# Patient Record
Sex: MALE | Race: WHITE | Employment: OTHER | URBAN - METROPOLITAN AREA
[De-identification: names, ages, dates, MRNs, and addresses within clinical notes are randomized per-mention and may not be internally consistent; named-entity substitution may affect disease eponyms.]

---

## 2017-01-10 ENCOUNTER — OFFICE VISIT (OUTPATIENT)
Dept: INTERNAL MEDICINE CLINIC | Age: 67
End: 2017-01-10

## 2017-01-10 VITALS
SYSTOLIC BLOOD PRESSURE: 132 MMHG | BODY MASS INDEX: 26.64 KG/M2 | HEIGHT: 73 IN | WEIGHT: 201 LBS | DIASTOLIC BLOOD PRESSURE: 82 MMHG | HEART RATE: 98 BPM

## 2017-01-10 DIAGNOSIS — R80.9 PROTEINURIA: ICD-10-CM

## 2017-01-10 DIAGNOSIS — Z13.9 SCREENING: ICD-10-CM

## 2017-01-10 DIAGNOSIS — J43.1 PANLOBULAR EMPHYSEMA (HCC): ICD-10-CM

## 2017-01-10 DIAGNOSIS — E11.9 TYPE 2 DIABETES MELLITUS WITHOUT COMPLICATION, WITHOUT LONG-TERM CURRENT USE OF INSULIN (HCC): ICD-10-CM

## 2017-01-10 DIAGNOSIS — E11.69 HYPERLIPIDEMIA ASSOCIATED WITH TYPE 2 DIABETES MELLITUS (HCC): ICD-10-CM

## 2017-01-10 DIAGNOSIS — E55.9 VITAMIN D DEFICIENCY: ICD-10-CM

## 2017-01-10 DIAGNOSIS — E11.9 TYPE 2 DIABETES MELLITUS WITHOUT COMPLICATION, WITHOUT LONG-TERM CURRENT USE OF INSULIN (HCC): Primary | ICD-10-CM

## 2017-01-10 DIAGNOSIS — E78.5 HYPERLIPIDEMIA ASSOCIATED WITH TYPE 2 DIABETES MELLITUS (HCC): ICD-10-CM

## 2017-01-10 LAB
A/G RATIO: 1.8 (ref 1.1–2.2)
ALBUMIN SERPL-MCNC: 4.6 G/DL (ref 3.4–5)
ALP BLD-CCNC: 69 U/L (ref 40–129)
ALT SERPL-CCNC: 24 U/L (ref 10–40)
ANION GAP SERPL CALCULATED.3IONS-SCNC: 14 MMOL/L (ref 3–16)
AST SERPL-CCNC: 15 U/L (ref 15–37)
BILIRUB SERPL-MCNC: 0.3 MG/DL (ref 0–1)
BUN BLDV-MCNC: 10 MG/DL (ref 7–20)
CALCIUM SERPL-MCNC: 9.6 MG/DL (ref 8.3–10.6)
CHLORIDE BLD-SCNC: 101 MMOL/L (ref 99–110)
CHOLESTEROL, TOTAL: 114 MG/DL (ref 0–199)
CO2: 27 MMOL/L (ref 21–32)
CREAT SERPL-MCNC: 0.7 MG/DL (ref 0.8–1.3)
GFR AFRICAN AMERICAN: >60
GFR NON-AFRICAN AMERICAN: >60
GLOBULIN: 2.5 G/DL
GLUCOSE BLD-MCNC: 110 MG/DL (ref 70–99)
HDLC SERPL-MCNC: 41 MG/DL (ref 40–60)
HEPATITIS C ANTIBODY INTERPRETATION: NORMAL
LDL CHOLESTEROL CALCULATED: 46 MG/DL
POTASSIUM SERPL-SCNC: 5 MMOL/L (ref 3.5–5.1)
PROSTATE SPECIFIC ANTIGEN: 0.41 NG/ML (ref 0–4)
SODIUM BLD-SCNC: 142 MMOL/L (ref 136–145)
TOTAL PROTEIN: 7.1 G/DL (ref 6.4–8.2)
TRIGL SERPL-MCNC: 134 MG/DL (ref 0–150)
VITAMIN D 25-HYDROXY: 30.6 NG/ML
VLDLC SERPL CALC-MCNC: 27 MG/DL

## 2017-01-10 PROCEDURE — 99214 OFFICE O/P EST MOD 30 MIN: CPT | Performed by: INTERNAL MEDICINE

## 2017-01-10 PROCEDURE — 90670 PCV13 VACCINE IM: CPT | Performed by: INTERNAL MEDICINE

## 2017-01-10 PROCEDURE — 90471 IMMUNIZATION ADMIN: CPT | Performed by: INTERNAL MEDICINE

## 2017-07-20 ENCOUNTER — OFFICE VISIT (OUTPATIENT)
Dept: INTERNAL MEDICINE CLINIC | Age: 67
End: 2017-07-20

## 2017-07-20 VITALS
BODY MASS INDEX: 26.53 KG/M2 | TEMPERATURE: 97.7 F | HEART RATE: 80 BPM | HEIGHT: 73 IN | SYSTOLIC BLOOD PRESSURE: 114 MMHG | WEIGHT: 200.2 LBS | DIASTOLIC BLOOD PRESSURE: 72 MMHG | RESPIRATION RATE: 16 BRPM

## 2017-07-20 DIAGNOSIS — E11.42 DIABETIC POLYNEUROPATHY ASSOCIATED WITH TYPE 2 DIABETES MELLITUS (HCC): ICD-10-CM

## 2017-07-20 DIAGNOSIS — E11.9 TYPE 2 DIABETES MELLITUS WITHOUT COMPLICATION, WITH LONG-TERM CURRENT USE OF INSULIN (HCC): Primary | ICD-10-CM

## 2017-07-20 DIAGNOSIS — J43.1 PANLOBULAR EMPHYSEMA (HCC): ICD-10-CM

## 2017-07-20 DIAGNOSIS — Z79.4 TYPE 2 DIABETES MELLITUS WITHOUT COMPLICATION, WITH LONG-TERM CURRENT USE OF INSULIN (HCC): Primary | ICD-10-CM

## 2017-07-20 DIAGNOSIS — Z23 NEED FOR TDAP VACCINATION: ICD-10-CM

## 2017-07-20 DIAGNOSIS — E78.5 HYPERLIPIDEMIA ASSOCIATED WITH TYPE 2 DIABETES MELLITUS (HCC): ICD-10-CM

## 2017-07-20 DIAGNOSIS — E11.69 HYPERLIPIDEMIA ASSOCIATED WITH TYPE 2 DIABETES MELLITUS (HCC): ICD-10-CM

## 2017-07-20 PROCEDURE — 99214 OFFICE O/P EST MOD 30 MIN: CPT | Performed by: INTERNAL MEDICINE

## 2017-07-20 PROCEDURE — 90715 TDAP VACCINE 7 YRS/> IM: CPT | Performed by: INTERNAL MEDICINE

## 2017-07-20 PROCEDURE — 90471 IMMUNIZATION ADMIN: CPT | Performed by: INTERNAL MEDICINE

## 2017-07-20 ASSESSMENT — PATIENT HEALTH QUESTIONNAIRE - PHQ9
SUM OF ALL RESPONSES TO PHQ QUESTIONS 1-9: 0
1. LITTLE INTEREST OR PLEASURE IN DOING THINGS: 0
2. FEELING DOWN, DEPRESSED OR HOPELESS: 0
SUM OF ALL RESPONSES TO PHQ9 QUESTIONS 1 & 2: 0

## 2017-10-02 ENCOUNTER — NURSE ONLY (OUTPATIENT)
Dept: INTERNAL MEDICINE CLINIC | Age: 67
End: 2017-10-02

## 2017-10-02 DIAGNOSIS — Z23 IMMUNIZATION DUE: Primary | ICD-10-CM

## 2017-10-02 PROCEDURE — 90471 IMMUNIZATION ADMIN: CPT | Performed by: INTERNAL MEDICINE

## 2017-10-02 PROCEDURE — 90662 IIV NO PRSV INCREASED AG IM: CPT | Performed by: INTERNAL MEDICINE

## 2017-10-02 NOTE — PROGRESS NOTES
Vaccine Information Sheet, \"Influenza - Inactivated\"  given to Nichol Harris, or parent/legal guardian of  Nichol Harris and verbalized understanding. Patient responses:    Have you ever had a reaction to a flu vaccine? No  Are you able to eat eggs without adverse effects? Yes  Do you have any current illness? No  Have you ever had Guillian [place]el[place] Syndrome? No    Flu vaccine given per order. Please see immunization tab.

## 2018-01-22 ENCOUNTER — OFFICE VISIT (OUTPATIENT)
Dept: INTERNAL MEDICINE CLINIC | Age: 68
End: 2018-01-22

## 2018-01-22 VITALS
RESPIRATION RATE: 16 BRPM | BODY MASS INDEX: 26.61 KG/M2 | DIASTOLIC BLOOD PRESSURE: 66 MMHG | HEIGHT: 73 IN | WEIGHT: 200.8 LBS | SYSTOLIC BLOOD PRESSURE: 105 MMHG | HEART RATE: 76 BPM

## 2018-01-22 DIAGNOSIS — E11.9 TYPE 2 DIABETES MELLITUS WITHOUT COMPLICATION, WITHOUT LONG-TERM CURRENT USE OF INSULIN (HCC): Primary | ICD-10-CM

## 2018-01-22 DIAGNOSIS — E11.69 HYPERLIPIDEMIA ASSOCIATED WITH TYPE 2 DIABETES MELLITUS (HCC): ICD-10-CM

## 2018-01-22 DIAGNOSIS — E55.9 VITAMIN D DEFICIENCY: ICD-10-CM

## 2018-01-22 DIAGNOSIS — J43.1 PANLOBULAR EMPHYSEMA (HCC): ICD-10-CM

## 2018-01-22 DIAGNOSIS — E11.9 TYPE 2 DIABETES MELLITUS WITHOUT COMPLICATION, WITHOUT LONG-TERM CURRENT USE OF INSULIN (HCC): ICD-10-CM

## 2018-01-22 DIAGNOSIS — E78.5 HYPERLIPIDEMIA ASSOCIATED WITH TYPE 2 DIABETES MELLITUS (HCC): ICD-10-CM

## 2018-01-22 LAB
A/G RATIO: 1.9 (ref 1.1–2.2)
ALBUMIN SERPL-MCNC: 4.6 G/DL (ref 3.4–5)
ALP BLD-CCNC: 68 U/L (ref 40–129)
ALT SERPL-CCNC: 28 U/L (ref 10–40)
ANION GAP SERPL CALCULATED.3IONS-SCNC: 14 MMOL/L (ref 3–16)
AST SERPL-CCNC: 20 U/L (ref 15–37)
BILIRUB SERPL-MCNC: 0.3 MG/DL (ref 0–1)
BUN BLDV-MCNC: 12 MG/DL (ref 7–20)
CALCIUM SERPL-MCNC: 10 MG/DL (ref 8.3–10.6)
CHLORIDE BLD-SCNC: 101 MMOL/L (ref 99–110)
CHOLESTEROL, TOTAL: 115 MG/DL (ref 0–199)
CO2: 27 MMOL/L (ref 21–32)
CREAT SERPL-MCNC: 0.8 MG/DL (ref 0.8–1.3)
GFR AFRICAN AMERICAN: >60
GFR NON-AFRICAN AMERICAN: >60
GLOBULIN: 2.4 G/DL
GLUCOSE BLD-MCNC: 101 MG/DL (ref 70–99)
HBA1C MFR BLD: 6.2 %
HDLC SERPL-MCNC: 37 MG/DL (ref 40–60)
LDL CHOLESTEROL CALCULATED: 48 MG/DL
POTASSIUM SERPL-SCNC: 4.8 MMOL/L (ref 3.5–5.1)
SODIUM BLD-SCNC: 142 MMOL/L (ref 136–145)
TOTAL PROTEIN: 7 G/DL (ref 6.4–8.2)
TRIGL SERPL-MCNC: 149 MG/DL (ref 0–150)
VLDLC SERPL CALC-MCNC: 30 MG/DL

## 2018-01-22 PROCEDURE — 83036 HEMOGLOBIN GLYCOSYLATED A1C: CPT | Performed by: INTERNAL MEDICINE

## 2018-01-22 PROCEDURE — 90471 IMMUNIZATION ADMIN: CPT | Performed by: INTERNAL MEDICINE

## 2018-01-22 PROCEDURE — 99214 OFFICE O/P EST MOD 30 MIN: CPT | Performed by: INTERNAL MEDICINE

## 2018-01-22 PROCEDURE — 90732 PPSV23 VACC 2 YRS+ SUBQ/IM: CPT | Performed by: INTERNAL MEDICINE

## 2018-01-23 LAB
ESTIMATED AVERAGE GLUCOSE: 134.1 MG/DL
HBA1C MFR BLD: 6.3 %

## 2018-01-30 ENCOUNTER — HOSPITAL ENCOUNTER (OUTPATIENT)
Dept: ULTRASOUND IMAGING | Age: 68
Discharge: HOME | End: 2018-01-30
Attending: INTERNAL MEDICINE | Admitting: INTERNAL MEDICINE

## 2018-01-30 DIAGNOSIS — E11.9 TYPE 2 DIABETES MELLITUS WITHOUT COMPLICATIONS (HCC): ICD-10-CM

## 2018-01-30 DIAGNOSIS — E11.9 TYPE 2 DIABETES MELLITUS WITHOUT COMPLICATION, WITHOUT LONG-TERM CURRENT USE OF INSULIN (HCC): ICD-10-CM

## 2018-05-18 ENCOUNTER — OFFICE VISIT (OUTPATIENT)
Dept: INTERNAL MEDICINE CLINIC | Age: 68
End: 2018-05-18

## 2018-05-18 VITALS
OXYGEN SATURATION: 98 % | BODY MASS INDEX: 26.77 KG/M2 | DIASTOLIC BLOOD PRESSURE: 82 MMHG | HEIGHT: 73 IN | SYSTOLIC BLOOD PRESSURE: 130 MMHG | WEIGHT: 202 LBS | RESPIRATION RATE: 16 BRPM | HEART RATE: 82 BPM

## 2018-05-18 DIAGNOSIS — E11.9 TYPE 2 DIABETES MELLITUS WITHOUT COMPLICATION, WITHOUT LONG-TERM CURRENT USE OF INSULIN (HCC): Primary | ICD-10-CM

## 2018-05-18 DIAGNOSIS — E11.69 HYPERLIPIDEMIA ASSOCIATED WITH TYPE 2 DIABETES MELLITUS (HCC): ICD-10-CM

## 2018-05-18 DIAGNOSIS — E78.5 HYPERLIPIDEMIA ASSOCIATED WITH TYPE 2 DIABETES MELLITUS (HCC): ICD-10-CM

## 2018-05-18 DIAGNOSIS — J43.1 PANLOBULAR EMPHYSEMA (HCC): ICD-10-CM

## 2018-05-18 PROCEDURE — 99214 OFFICE O/P EST MOD 30 MIN: CPT | Performed by: INTERNAL MEDICINE

## 2018-07-18 DIAGNOSIS — E11.9 TYPE 2 DIABETES MELLITUS WITHOUT COMPLICATION, WITH LONG-TERM CURRENT USE OF INSULIN (HCC): ICD-10-CM

## 2018-07-18 DIAGNOSIS — Z79.4 TYPE 2 DIABETES MELLITUS WITHOUT COMPLICATION, WITH LONG-TERM CURRENT USE OF INSULIN (HCC): ICD-10-CM

## 2018-09-18 ENCOUNTER — OFFICE VISIT (OUTPATIENT)
Dept: INTERNAL MEDICINE CLINIC | Age: 68
End: 2018-09-18

## 2018-09-18 VITALS
RESPIRATION RATE: 16 BRPM | HEART RATE: 82 BPM | SYSTOLIC BLOOD PRESSURE: 124 MMHG | HEIGHT: 73 IN | BODY MASS INDEX: 26.9 KG/M2 | WEIGHT: 203 LBS | OXYGEN SATURATION: 95 % | DIASTOLIC BLOOD PRESSURE: 78 MMHG

## 2018-09-18 DIAGNOSIS — E78.5 HYPERLIPIDEMIA ASSOCIATED WITH TYPE 2 DIABETES MELLITUS (HCC): ICD-10-CM

## 2018-09-18 DIAGNOSIS — E55.9 VITAMIN D DEFICIENCY: ICD-10-CM

## 2018-09-18 DIAGNOSIS — E11.9 TYPE 2 DIABETES MELLITUS WITHOUT COMPLICATION, WITHOUT LONG-TERM CURRENT USE OF INSULIN (HCC): Primary | ICD-10-CM

## 2018-09-18 DIAGNOSIS — E11.9 TYPE 2 DIABETES MELLITUS WITHOUT COMPLICATION, WITHOUT LONG-TERM CURRENT USE OF INSULIN (HCC): ICD-10-CM

## 2018-09-18 DIAGNOSIS — J43.1 PANLOBULAR EMPHYSEMA (HCC): ICD-10-CM

## 2018-09-18 DIAGNOSIS — E11.69 HYPERLIPIDEMIA ASSOCIATED WITH TYPE 2 DIABETES MELLITUS (HCC): ICD-10-CM

## 2018-09-18 LAB
ALBUMIN SERPL-MCNC: 4.6 G/DL (ref 3.4–5)
ANION GAP SERPL CALCULATED.3IONS-SCNC: 15 MMOL/L (ref 3–16)
BUN BLDV-MCNC: 15 MG/DL (ref 7–20)
CALCIUM SERPL-MCNC: 9.4 MG/DL (ref 8.3–10.6)
CHLORIDE BLD-SCNC: 101 MMOL/L (ref 99–110)
CO2: 22 MMOL/L (ref 21–32)
CREAT SERPL-MCNC: 0.8 MG/DL (ref 0.8–1.3)
GFR AFRICAN AMERICAN: >60
GFR NON-AFRICAN AMERICAN: >60
GLUCOSE BLD-MCNC: 115 MG/DL (ref 70–99)
PHOSPHORUS: 4.1 MG/DL (ref 2.5–4.9)
POTASSIUM SERPL-SCNC: 4.6 MMOL/L (ref 3.5–5.1)
SODIUM BLD-SCNC: 138 MMOL/L (ref 136–145)
VITAMIN D 25-HYDROXY: 50.6 NG/ML

## 2018-09-18 PROCEDURE — G8926 SPIRO NO PERF OR DOC: HCPCS | Performed by: INTERNAL MEDICINE

## 2018-09-18 PROCEDURE — 90662 IIV NO PRSV INCREASED AG IM: CPT | Performed by: INTERNAL MEDICINE

## 2018-09-18 PROCEDURE — 99214 OFFICE O/P EST MOD 30 MIN: CPT | Performed by: INTERNAL MEDICINE

## 2018-09-18 PROCEDURE — G8419 CALC BMI OUT NRM PARAM NOF/U: HCPCS | Performed by: INTERNAL MEDICINE

## 2018-09-18 PROCEDURE — 3044F HG A1C LEVEL LT 7.0%: CPT | Performed by: INTERNAL MEDICINE

## 2018-09-18 PROCEDURE — 1123F ACP DISCUSS/DSCN MKR DOCD: CPT | Performed by: INTERNAL MEDICINE

## 2018-09-18 PROCEDURE — G0008 ADMIN INFLUENZA VIRUS VAC: HCPCS | Performed by: INTERNAL MEDICINE

## 2018-09-18 PROCEDURE — 2022F DILAT RTA XM EVC RTNOPTHY: CPT | Performed by: INTERNAL MEDICINE

## 2018-09-18 PROCEDURE — G8427 DOCREV CUR MEDS BY ELIG CLIN: HCPCS | Performed by: INTERNAL MEDICINE

## 2018-09-18 PROCEDURE — 1101F PT FALLS ASSESS-DOCD LE1/YR: CPT | Performed by: INTERNAL MEDICINE

## 2018-09-18 PROCEDURE — 1036F TOBACCO NON-USER: CPT | Performed by: INTERNAL MEDICINE

## 2018-09-18 PROCEDURE — 4040F PNEUMOC VAC/ADMIN/RCVD: CPT | Performed by: INTERNAL MEDICINE

## 2018-09-18 PROCEDURE — 3023F SPIROM DOC REV: CPT | Performed by: INTERNAL MEDICINE

## 2018-09-18 PROCEDURE — 3017F COLORECTAL CA SCREEN DOC REV: CPT | Performed by: INTERNAL MEDICINE

## 2018-09-18 ASSESSMENT — PATIENT HEALTH QUESTIONNAIRE - PHQ9
SUM OF ALL RESPONSES TO PHQ QUESTIONS 1-9: 0
1. LITTLE INTEREST OR PLEASURE IN DOING THINGS: 0
SUM OF ALL RESPONSES TO PHQ QUESTIONS 1-9: 0
2. FEELING DOWN, DEPRESSED OR HOPELESS: 0
SUM OF ALL RESPONSES TO PHQ9 QUESTIONS 1 & 2: 0

## 2018-09-18 NOTE — PROGRESS NOTES
Chief Complaint   Patient presents with    Diabetes       HPI: Here for f/u diabetes and management of multiple chronic conditions as per the active problems list below, which I reviewed and updated with the patient today. States doing well with no new concerns except if noted below. I have reviewed the chart notes available from myself and other providers. I have addressed all active problems listed below, and created or updated the problems list as needed. Patient Active Problem List   Diagnosis    COPD [facility]chronic obstructive [facility] disease) ([facility]    Hyperlipidemia associated with type 2 diabetes mellitus ([facility]    [facility]    Vitamin D deficiency    Proteinuria       No problem-specific Assessment & Plan notes found for this encounter.       Discussed all labs, other tests, and imaging if available   Lab Results   Component Value Date    WBC 11.2 (H) 10/02/2015    HGB 16.3 10/02/2015    HCT 48.1 10/02/2015    MCV 88.6 10/02/2015     10/02/2015    LYMPHOPCT 16.3 10/02/2015    RBC 5.43 10/02/2015    MCH 30.0 10/02/2015    MCHC 33.8 10/02/2015    RDW 14.5 10/02/2015     Lab Results   Component Value Date     01/22/2018    K 4.8 01/22/2018     01/22/2018    CO2 27 01/22/2018    BUN 12 01/22/2018    CREATININE 0.8 01/22/2018    GLUCOSE 101 (H) 01/22/2018    CALCIUM 10.0 01/22/2018    PROT 7.0 01/22/2018    LABALBU 4.6 01/22/2018    BILITOT 0.3 01/22/2018    ALKPHOS 68 01/22/2018    AST 20 01/22/2018    ALT 28 01/22/2018    LABGLOM >60 01/22/2018    GFRAA >60 01/22/2018    AGRATIO 1.9 01/22/2018    GLOB 2.4 01/22/2018     Lab Results   Component Value Date    TRIG 149 01/22/2018    TRIG 134 01/10/2017    TRIG 154 (H) 10/02/2015     Lab Results   Component Value Date    HDL 37 (L) 01/22/2018    HDL 41 01/10/2017    HDL 29 (L) 10/02/2015     Lab Results   Component Value Date    LDLCALC 48 01/22/2018    LDLCALC 46 01/10/2017    LDLCALC 55

## 2018-09-19 LAB
ESTIMATED AVERAGE GLUCOSE: 131.2 MG/DL
HBA1C MFR BLD: 6.2 %

## 2018-11-21 DIAGNOSIS — I15.2 HYPERTENSION COMPLICATING DIABETES (HCC): ICD-10-CM

## 2018-11-21 DIAGNOSIS — E11.59 HYPERTENSION COMPLICATING DIABETES (HCC): ICD-10-CM

## 2018-11-23 DIAGNOSIS — I15.2 HYPERTENSION COMPLICATING DIABETES (HCC): ICD-10-CM

## 2018-11-23 DIAGNOSIS — E11.59 HYPERTENSION COMPLICATING DIABETES (HCC): ICD-10-CM

## 2018-11-23 RX ORDER — LOSARTAN POTASSIUM 25 MG/1
TABLET ORAL
Qty: 90 TABLET | Refills: 0 | Status: SHIPPED | OUTPATIENT
Start: 2018-11-23 | End: 2019-02-27 | Stop reason: SDUPTHER

## 2018-11-23 RX ORDER — LOSARTAN POTASSIUM 25 MG/1
TABLET ORAL
Qty: 90 TABLET | Refills: 0 | Status: SHIPPED | OUTPATIENT
Start: 2018-11-23 | End: 2019-01-24

## 2018-11-23 NOTE — TELEPHONE ENCOUNTER
Patient requesting a medication refill.   Medication: losartan  Dosage: 25 mg  Frequency: 1 tab daily  Last filled on: 8/28/18  Pharmacy: [id number]  Next office visit: 1/24/18  Last regular office visit: 9/18/18

## 2019-01-23 DIAGNOSIS — E11.69 HYPERLIPIDEMIA ASSOCIATED WITH TYPE 2 DIABETES MELLITUS (HCC): ICD-10-CM

## 2019-01-23 DIAGNOSIS — E78.5 HYPERLIPIDEMIA ASSOCIATED WITH TYPE 2 DIABETES MELLITUS (HCC): ICD-10-CM

## 2019-01-23 RX ORDER — SIMVASTATIN 40 MG
TABLET ORAL
Qty: 90 TABLET | Refills: 0 | Status: CANCELLED | OUTPATIENT
Start: 2019-01-23

## 2019-01-24 ENCOUNTER — OFFICE VISIT (OUTPATIENT)
Dept: INTERNAL MEDICINE CLINIC | Age: 69
End: 2019-01-24
Payer: MEDICARE

## 2019-01-24 VITALS
HEART RATE: 89 BPM | BODY MASS INDEX: 27.04 KG/M2 | RESPIRATION RATE: 16 BRPM | HEIGHT: 73 IN | DIASTOLIC BLOOD PRESSURE: 72 MMHG | SYSTOLIC BLOOD PRESSURE: 116 MMHG | WEIGHT: 204 LBS

## 2019-01-24 DIAGNOSIS — E55.9 VITAMIN D DEFICIENCY: ICD-10-CM

## 2019-01-24 DIAGNOSIS — E78.5 HYPERLIPIDEMIA ASSOCIATED WITH TYPE 2 DIABETES MELLITUS (HCC): ICD-10-CM

## 2019-01-24 DIAGNOSIS — E11.9 TYPE 2 DIABETES MELLITUS WITHOUT COMPLICATION, WITHOUT LONG-TERM CURRENT USE OF INSULIN (HCC): Primary | ICD-10-CM

## 2019-01-24 DIAGNOSIS — E11.69 HYPERLIPIDEMIA ASSOCIATED WITH TYPE 2 DIABETES MELLITUS (HCC): ICD-10-CM

## 2019-01-24 DIAGNOSIS — J43.1 PANLOBULAR EMPHYSEMA (HCC): ICD-10-CM

## 2019-01-24 LAB
A/G RATIO: 2.2 (ref 1.1–2.2)
ALBUMIN SERPL-MCNC: 4.8 G/DL (ref 3.4–5)
ALP BLD-CCNC: 75 U/L (ref 40–129)
ALT SERPL-CCNC: 34 U/L (ref 10–40)
ANION GAP SERPL CALCULATED.3IONS-SCNC: 14 MMOL/L (ref 3–16)
AST SERPL-CCNC: 19 U/L (ref 15–37)
BILIRUB SERPL-MCNC: 0.4 MG/DL (ref 0–1)
BUN BLDV-MCNC: 13 MG/DL (ref 7–20)
CALCIUM SERPL-MCNC: 9.2 MG/DL (ref 8.3–10.6)
CHLORIDE BLD-SCNC: 102 MMOL/L (ref 99–110)
CHOLESTEROL, TOTAL: 106 MG/DL (ref 0–199)
CO2: 23 MMOL/L (ref 21–32)
CREAT SERPL-MCNC: 0.8 MG/DL (ref 0.8–1.3)
GFR AFRICAN AMERICAN: >60
GFR NON-AFRICAN AMERICAN: >60
GLOBULIN: 2.2 G/DL
GLUCOSE BLD-MCNC: 105 MG/DL (ref 70–99)
HDLC SERPL-MCNC: 32 MG/DL (ref 40–60)
LDL CHOLESTEROL CALCULATED: 45 MG/DL
POTASSIUM SERPL-SCNC: 4.9 MMOL/L (ref 3.5–5.1)
SODIUM BLD-SCNC: 139 MMOL/L (ref 136–145)
TOTAL PROTEIN: 7 G/DL (ref 6.4–8.2)
TRIGL SERPL-MCNC: 147 MG/DL (ref 0–150)
VITAMIN D 25-HYDROXY: 42.2 NG/ML
VLDLC SERPL CALC-MCNC: 29 MG/DL

## 2019-01-24 PROCEDURE — 99214 OFFICE O/P EST MOD 30 MIN: CPT | Performed by: INTERNAL MEDICINE

## 2019-01-24 RX ORDER — SIMVASTATIN 40 MG
40 TABLET ORAL NIGHTLY
Qty: 90 TABLET | Refills: 3 | Status: SHIPPED | OUTPATIENT
Start: 2019-01-24 | End: 2020-01-14

## 2019-01-24 ASSESSMENT — ENCOUNTER SYMPTOMS
WHEEZING: 0
COUGH: 1
CONSTIPATION: 0
SORE THROAT: 0
ABDOMINAL PAIN: 0
EYE PAIN: 0
DIARRHEA: 0
EYE REDNESS: 0
BLOOD IN STOOL: 0
PHOTOPHOBIA: 1
SHORTNESS OF BREATH: 0
NAUSEA: 0
BACK PAIN: 0
VOMITING: 0
EYE DISCHARGE: 0
STRIDOR: 0

## 2019-02-27 DIAGNOSIS — E11.59 HYPERTENSION COMPLICATING DIABETES (HCC): ICD-10-CM

## 2019-02-27 DIAGNOSIS — I15.2 HYPERTENSION COMPLICATING DIABETES (HCC): ICD-10-CM

## 2019-02-28 RX ORDER — LOSARTAN POTASSIUM 25 MG/1
25 TABLET ORAL DAILY
Qty: 90 TABLET | Refills: 0 | Status: SHIPPED | OUTPATIENT
Start: 2019-02-28 | End: 2019-05-24 | Stop reason: SDUPTHER

## 2019-05-24 ENCOUNTER — OFFICE VISIT (OUTPATIENT)
Dept: INTERNAL MEDICINE CLINIC | Age: 69
End: 2019-05-24
Payer: MEDICARE

## 2019-05-24 VITALS
SYSTOLIC BLOOD PRESSURE: 121 MMHG | WEIGHT: 201 LBS | DIASTOLIC BLOOD PRESSURE: 77 MMHG | OXYGEN SATURATION: 94 % | HEART RATE: 88 BPM | BODY MASS INDEX: 26.52 KG/M2

## 2019-05-24 DIAGNOSIS — E11.69 HYPERLIPIDEMIA ASSOCIATED WITH TYPE 2 DIABETES MELLITUS (HCC): ICD-10-CM

## 2019-05-24 DIAGNOSIS — J43.1 PANLOBULAR EMPHYSEMA (HCC): ICD-10-CM

## 2019-05-24 DIAGNOSIS — E11.9 TYPE 2 DIABETES MELLITUS WITHOUT COMPLICATION, WITH LONG-TERM CURRENT USE OF INSULIN (HCC): ICD-10-CM

## 2019-05-24 DIAGNOSIS — Z79.4 TYPE 2 DIABETES MELLITUS WITHOUT COMPLICATION, WITH LONG-TERM CURRENT USE OF INSULIN (HCC): ICD-10-CM

## 2019-05-24 DIAGNOSIS — E11.59 HYPERTENSION COMPLICATING DIABETES (HCC): ICD-10-CM

## 2019-05-24 DIAGNOSIS — I15.2 HYPERTENSION COMPLICATING DIABETES (HCC): ICD-10-CM

## 2019-05-24 DIAGNOSIS — E11.9 TYPE 2 DIABETES MELLITUS WITHOUT COMPLICATION, WITHOUT LONG-TERM CURRENT USE OF INSULIN (HCC): Primary | ICD-10-CM

## 2019-05-24 DIAGNOSIS — E78.5 HYPERLIPIDEMIA ASSOCIATED WITH TYPE 2 DIABETES MELLITUS (HCC): ICD-10-CM

## 2019-05-24 LAB — HBA1C MFR BLD: 6.4 %

## 2019-05-24 PROCEDURE — 83036 HEMOGLOBIN GLYCOSYLATED A1C: CPT | Performed by: INTERNAL MEDICINE

## 2019-05-24 PROCEDURE — 99214 OFFICE O/P EST MOD 30 MIN: CPT | Performed by: INTERNAL MEDICINE

## 2019-05-24 RX ORDER — LOSARTAN POTASSIUM 25 MG/1
25 TABLET ORAL DAILY
Qty: 90 TABLET | Refills: 3 | Status: SHIPPED | OUTPATIENT
Start: 2019-05-24 | End: 2020-05-16 | Stop reason: SDUPTHER

## 2019-05-24 ASSESSMENT — ENCOUNTER SYMPTOMS
WHEEZING: 0
EYE ITCHING: 0
SHORTNESS OF BREATH: 0
EYE REDNESS: 0
SINUS PRESSURE: 0
CHOKING: 0
VOMITING: 0
PHOTOPHOBIA: 0
ANAL BLEEDING: 0
STRIDOR: 0
DIARRHEA: 0
BLOOD IN STOOL: 0
RECTAL PAIN: 0
CHEST TIGHTNESS: 0
VOICE CHANGE: 0
ABDOMINAL DISTENTION: 0
GASTROINTESTINAL NEGATIVE: 1
COLOR CHANGE: 0
CONSTIPATION: 0
APNEA: 0
EYE PAIN: 0
BACK PAIN: 0
ABDOMINAL PAIN: 0
NAUSEA: 0
EYES NEGATIVE: 1
TROUBLE SWALLOWING: 0
EYE DISCHARGE: 0
RHINORRHEA: 0
RESPIRATORY NEGATIVE: 1
FACIAL SWELLING: 0
SORE THROAT: 0
COUGH: 0
ALLERGIC/IMMUNOLOGIC NEGATIVE: 1
SINUS PAIN: 0

## 2019-05-24 ASSESSMENT — PATIENT HEALTH QUESTIONNAIRE - PHQ9
1. LITTLE INTEREST OR PLEASURE IN DOING THINGS: 0
SUM OF ALL RESPONSES TO PHQ9 QUESTIONS 1 & 2: 0
SUM OF ALL RESPONSES TO PHQ QUESTIONS 1-9: 0
2. FEELING DOWN, DEPRESSED OR HOPELESS: 0
SUM OF ALL RESPONSES TO PHQ QUESTIONS 1-9: 0

## 2019-05-24 NOTE — PROGRESS NOTES
Chief Complaint   Patient presents with    Diabetes     4 month follow up        diabetes followup and management of multiple chronic conditions as per the active problems list below, which I reviewed and updated with the patient today. States doing well with no new concerns except if noted below. I have reviewed the chart notes available from myself and other providers. I have addressed all activeproblems listed below, and created or updated the problems list as needed. I have reviewed and updated the problems list in detail with patient. Patient Active Problem List   Diagnosis    [facility]chronic obstructive [facility] disease) [facility]    Hyperlipidemia associated with type 2 diabetes mellitus ([facility]    [facility]    Vitamin D deficiency    Proteinuria       No problem-specific Assessment & Plan notes found for this encounter.       Discussed all labs, other tests, and imaging if available   Lab Results   Component Value Date    WBC 11.2 (H) 10/02/2015    HGB 16.3 10/02/2015    HCT 48.1 10/02/2015    MCV 88.6 10/02/2015     10/02/2015    LYMPHOPCT 16.3 10/02/2015    RBC 5.43 10/02/2015    MCH 30.0 10/02/2015    MCHC 33.8 10/02/2015    RDW 14.5 10/02/2015     Lab Results   Component Value Date     01/24/2019    K 4.9 01/24/2019     01/24/2019    CO2 23 01/24/2019    BUN 13 01/24/2019    CREATININE 0.8 01/24/2019    GLUCOSE 105 (H) 01/24/2019    CALCIUM 9.2 01/24/2019    PROT 7.0 01/24/2019    LABALBU 4.8 01/24/2019    BILITOT 0.4 01/24/2019    ALKPHOS 75 01/24/2019    AST 19 01/24/2019    ALT 34 01/24/2019    LABGLOM >60 01/24/2019    GFRAA >60 01/24/2019    AGRATIO 2.2 01/24/2019    GLOB 2.2 01/24/2019     Lab Results   Component Value Date    TRIG 147 01/24/2019    TRIG 149 01/22/2018    TRIG 134 01/10/2017     Lab Results   Component Value Date    HDL 32 (L) 01/24/2019    HDL 37 (L) 01/22/2018    HDL 41 01/10/2017     Lab Results   Component Value Date    LDLCALC 45 01/24/2019    LDLCALC 48 01/22/2018    LDLCALC 46 01/10/2017     Lab Results   Component Value Date    PSA 0.41 01/10/2017    PSA 0.4 10/02/2015    PSA 0.41 07/01/2013    PSADIA 0.37 07/02/2014     Lab Results   Component Value Date    TSH 1.97 10/02/2015     Lab Results   Component Value Date    LABA1C 6.4 05/24/2019     Lab Results   Component Value Date    .2 09/18/2018       I have extensively reviewed and reconciled the medication list, discontinued medications not taking or no longer appropriate, and updated the active meds list    Prior to Admission medications    Medication Sig Start Date End Date Taking?  Authorizing Provider   losartan (COZAAR) 25 MG tablet Take 1 tablet by mouth daily 5/24/19  Yes Belen Craig MD   metFORMIN (GLUCOPHAGE) 500 MG tablet Take 1 tablet by mouth daily (with breakfast) 5/24/19  Yes Belen Craig MD   simvastatin (ZOCOR) 40 MG tablet Take 1 tablet by mouth nightly 1/24/19  Yes Belen Craig MD   Cholecalciferol (VITAMIN D3) 1000 units CAPS Take 1,000 Units by mouth 2 times daily 1/22/18  Yes Belen Craig MD   aspirin 81 MG tablet Take 81 mg by mouth daily   Yes Historical Provider, MD   vitamin B-12 (CYANOCOBALAMIN) 100 MCG tablet Take 50 mcg by mouth daily   Yes Historical Provider, MD   Ascorbic Acid (VITAMIN C) 250 MG tablet Take 250 mg by mouth daily   Yes Historical Provider, MD       ROS: 12 systems reviewed, as documented by MA    Past Medical History:   Diagnosis Date    [facility]    Diabetes mellitus type 2, uncontrolled [facility] 11.16.11    Fatigue 02.18.09    Hematuria 11.14.05    Hyperlipidemia 12.09.09    Hyperlipidemia 08.21.09    Hypertension 08.21.09    IFG (impaired fasting glucose)     Impaired fasting glucose 12.09.09    Routine general medical examination at a health care facility 03.24.10    Screening for lipoid disorders 02.18.09    Shortness of breath 09    Urinary tract infection 05       History reviewed. No pertinent surgical history. Social History     Socioeconomic History    Marital status:      Spouse name: Not on file    Number of children: Not on file    Years of education: Not on file    Highest education level: Not on file   Occupational History    Not on file   Social Needs    Financial resource strain: Not on file    Food insecurity:     Worry: Not on file     Inability: Not on file    Transportation needs:     Medical: Not on file     Non-medical: Not on file   Tobacco Use    Smoking status: Former Smoker     Packs/day: 15.00     Types: Cigarettes     Last attempt to quit: 2013     Years since quittin.2    Smokeless tobacco: Never Used    Tobacco comment: NAF 2013   Substance and Sexual Activity    Alcohol use: Yes     Comment: Occasional    Drug use: No    Sexual activity: Not on file   Lifestyle    Physical activity:     Days per week: Not on file     Minutes per session: Not on file    Stress: Not on file   Relationships    Social connections:     Talks on phone: Not on file     Gets together: Not on file     Attends Alevism service: Not on file     Active member of club or organization: Not on file     Attends meetings of clubs or organizations: Not on file     Relationship status: Not on file    Intimate partner violence:     Fear of current or ex partner: Not on file     Emotionally abused: Not on file     Physically abused: Not on file     Forced sexual activity: Not on file   Other Topics Concern    Not on file   Social History Narrative    Not on file       History reviewed. No pertinent family history. Health Maintenance Due   Topic Date Due    Shingles Vaccine (1 of 2) 1998         /77 (Site: Left Upper Arm, Position: Sitting)   Pulse 88   Wt 201 lb (91.2 kg)   SpO2 94%   BMI 26.52 kg/m²   Body mass index is 26.52 kg/m².   Physical Exam Constitutional: He is oriented to person, place, and time. He appears well-developed and well-nourished. No distress. HENT:   Head: Normocephalic and atraumatic. Nose: Nose normal.   Mouth/Throat: Oropharynx is clear and moist. No oropharyngeal exudate. Eyes: Pupils are equal, round, and reactive to light. Conjunctivae and EOM are normal. Right eye exhibits no discharge. Left eye exhibits no discharge. No scleral icterus. Neck: Normal range of motion. Neck supple. No JVD present. No tracheal deviation present. No thyromegaly present. Cardiovascular: Normal rate, regular rhythm, normal heart sounds and intact distal pulses. Exam reveals no gallop and no friction rub. No murmur heard. Pulmonary/Chest: Effort normal and breath sounds normal. No stridor. No respiratory distress. He has no wheezes. He exhibits no tenderness. Abdominal: Soft. Bowel sounds are normal. He exhibits no distension and no mass. There is no tenderness. There is no rebound and no guarding. Musculoskeletal: Normal range of motion. He exhibits no edema or tenderness. Lymphadenopathy:     He has no cervical adenopathy. Neurological: He is alert and oriented to person, place, and time. He has normal reflexes. He displays normal reflexes. No cranial nerve deficit. He exhibits normal muscle tone. Coordination normal.   Skin: Skin is warm and dry. No rash noted. He is not diaphoretic. No erythema. No pallor. Psychiatric: Judgment normal.       ASSESSMENT AND PLANS:      Except as noted below, all chronic problems have been reviewed and are stable to continue medications or other therapy as previously documented in the patient's chart, with changes per orders or documentation below:        Assessment and Plan: Patient received counseling and, if relevant,printed instructions for all symptoms listed in CC and HPI, as well as for all diagnoses brought onto today's visit note below.  Typical counseling includes, but is not limited to, non pharmacologic measures to manage listed symptoms and conditions; appropriate use, risks and benefits for all prescribed medications; potential interactions between medications both prescribed and OTC; diet; exercise; healthy behaviors; and goalsetting to improve health. Pt.or responsible party was involved in shared decision making and had opportunity to have all questions answered. 1. Type 2 diabetes mellitus without complication, without long-term current use of insulin (HCC)  - POCT glycosylated hemoglobin (Hb A1C)    2. Hypertension complicating diabetes ([facility]  - losartan (COZAAR) 25 MG tablet; Take 1 tablet by mouth daily  Dispense: 90 tablet; Refill: 3    3. Type 2 diabetes mellitus without complication, with long-term current use of insulin (HCC)  - metFORMIN (GLUCOPHAGE) 500 MG tablet; Take 1 tablet by mouth daily (with breakfast)  Dispense: 90 tablet; Refill: 3    4. Hyperlipidemia associated with type 2 diabetes mellitus ([facility]    5. Panlobular emphysema (HCC)--pt has no issues and on no medication    6. Seasonal allergies--pt happy with otc product (coricidin)            Problem List     Type 2 diabetes mellitus without complication ([facility] - Primary    Relevant Medications    metFORMIN (GLUCOPHAGE) 500 MG tablet    Other Relevant Orders    POCT glycosylated hemoglobin (Hb A1C) (Completed)    Hyperlipidemia associated with type 2 diabetes mellitus (HCC)    Relevant Medications    aspirin 81 MG tablet    simvastatin (ZOCOR) 40 MG tablet    losartan (COZAAR) 25 MG tablet    metFORMIN (GLUCOPHAGE) 500 MG tablet    COPD (chronic obstructive pulmonary disease) ([facility]        Orders Placed This Encounter   Procedures    POCT glycosylated hemoglobin (Hb A1C)       I have reconciled the medications in chart with what patient reports to be taking, andreviewed action/ sideeffects and how to take any new medications. Patient/caregiver understands purpose and side effects.   A complete  list of medications

## 2019-05-24 NOTE — PROGRESS NOTES
TRIG 149 01/22/2018    TRIG 134 01/10/2017     Lab Results   Component Value Date    HDL 32 (L) 01/24/2019    HDL 37 (L) 01/22/2018    HDL 41 01/10/2017     Lab Results   Component Value Date    LDLCALC 45 01/24/2019    LDLCALC 48 01/22/2018    LDLCALC 46 01/10/2017     Lab Results   Component Value Date    PSA 0.41 01/10/2017    PSA 0.4 10/02/2015    PSA 0.41 07/01/2013    PSADIA 0.37 07/02/2014     Lab Results   Component Value Date    TSH 1.97 10/02/2015     Lab Results   Component Value Date    LABA1C 6.2 09/18/2018     Lab Results   Component Value Date    .2 09/18/2018       I have extensively reviewed and reconciled the medication list, discontinued medications not taking or no longer appropriate, and updated the active meds list    Prior to Admission medications    Medication Sig Start Date End Date Taking?  Authorizing Provider   losartan (COZAAR) 25 MG tablet Take 1 tablet by mouth daily 5/24/19  Yes Juice Gonsalez MD   metFORMIN (GLUCOPHAGE) 500 MG tablet Take 1 tablet by mouth daily (with breakfast) 5/24/19  Yes Juice Gonsalez MD   simvastatin (ZOCOR) 40 MG tablet Take 1 tablet by mouth nightly 1/24/19  Yes Juice Gonsalez MD   Cholecalciferol (VITAMIN D3) 1000 units CAPS Take 1,000 Units by mouth 2 times daily 1/22/18  Yes Juice Gonsalez MD   aspirin 81 MG tablet Take 81 mg by mouth daily   Yes Historical Provider, MD   vitamin B-12 (CYANOCOBALAMIN) 100 MCG tablet Take 50 mcg by mouth daily   Yes Historical Provider, MD   Ascorbic Acid (VITAMIN C) 250 MG tablet Take 250 mg by mouth daily   Yes Historical Provider, MD       ROS: 12 systems reviewed, as documented by MA    Past Medical History:   Diagnosis Date    [facility] 03.24.10    Diabetes mellitus type 2, uncontrolled ([facility] 11.16.11    Fatigue 02.18.09    Hematuria 11.14.05    Hyperlipidemia 12.09.09    Hyperlipidemia 08.21.09    Hypertension 08.21.09    IFG (impaired fasting glucose)  Impaired fasting glucose 09    Routine general medical examination at a health care facility .10    Screening for lipoid disorders 09    Shortness of breath 09    Urinary tract infection .       History reviewed. No pertinent surgical history. Social History     Socioeconomic History    Marital status:      Spouse name: Not on file    Number of children: Not on file    Years of education: Not on file    Highest education level: Not on file   Occupational History    Not on file   Social Needs    Financial resource strain: Not on file    Food insecurity:     Worry: Not on file     Inability: Not on file    Transportation needs:     Medical: Not on file     Non-medical: Not on file   Tobacco Use    Smoking status: Former Smoker     Packs/day: 15.00     Types: Cigarettes     Last attempt to quit: 2013     Years since quittin.2    Smokeless tobacco: Never Used    Tobacco comment: NAF 2013   Substance and Sexual Activity    Alcohol use: Yes     Comment: Occasional    Drug use: No    Sexual activity: Not on file   Lifestyle    Physical activity:     Days per week: Not on file     Minutes per session: Not on file    Stress: Not on file   Relationships    Social connections:     Talks on phone: Not on file     Gets together: Not on file     Attends Druze service: Not on file     Active member of club or organization: Not on file     Attends meetings of clubs or organizations: Not on file     Relationship status: Not on file    Intimate partner violence:     Fear of current or ex partner: Not on file     Emotionally abused: Not on file     Physically abused: Not on file     Forced sexual activity: Not on file   Other Topics Concern    Not on file   Social History Narrative    Not on file       History reviewed. No pertinent family history.       Health Maintenance Due   Topic Date Due    Shingles Vaccine (1 of 2) 1998         BP taking, andreviewed action/ sideeffects and how to take any new medications. Patient/caregiver understands purpose and side effects. A complete  list of medications was provided in their after-visit summary. No follow-ups on file. Time basedbilling: I spent over 25 minutes with this patient, and as is the nature of primary care and typical for my extended visits, over 50 percent of this visit was spent on counseling and coordination ofcare.

## 2019-09-24 ENCOUNTER — OFFICE VISIT (OUTPATIENT)
Dept: INTERNAL MEDICINE CLINIC | Age: 69
End: 2019-09-24
Payer: MEDICARE

## 2019-09-24 VITALS
HEART RATE: 81 BPM | DIASTOLIC BLOOD PRESSURE: 75 MMHG | OXYGEN SATURATION: 96 % | RESPIRATION RATE: 16 BRPM | SYSTOLIC BLOOD PRESSURE: 123 MMHG | HEIGHT: 73 IN | WEIGHT: 197.4 LBS | TEMPERATURE: 97.1 F | BODY MASS INDEX: 26.16 KG/M2

## 2019-09-24 DIAGNOSIS — E11.69 HYPERLIPIDEMIA ASSOCIATED WITH TYPE 2 DIABETES MELLITUS (HCC): ICD-10-CM

## 2019-09-24 DIAGNOSIS — E78.5 HYPERLIPIDEMIA ASSOCIATED WITH TYPE 2 DIABETES MELLITUS (HCC): ICD-10-CM

## 2019-09-24 DIAGNOSIS — J43.1 PANLOBULAR EMPHYSEMA (HCC): ICD-10-CM

## 2019-09-24 DIAGNOSIS — E55.9 VITAMIN D DEFICIENCY: ICD-10-CM

## 2019-09-24 DIAGNOSIS — E11.9 TYPE 2 DIABETES MELLITUS WITHOUT COMPLICATION, WITHOUT LONG-TERM CURRENT USE OF INSULIN (HCC): Primary | ICD-10-CM

## 2019-09-24 PROCEDURE — 99214 OFFICE O/P EST MOD 30 MIN: CPT | Performed by: INTERNAL MEDICINE

## 2019-09-24 PROCEDURE — 90653 IIV ADJUVANT VACCINE IM: CPT | Performed by: INTERNAL MEDICINE

## 2019-09-24 PROCEDURE — G0008 ADMIN INFLUENZA VIRUS VAC: HCPCS | Performed by: INTERNAL MEDICINE

## 2019-09-24 ASSESSMENT — ENCOUNTER SYMPTOMS
PHOTOPHOBIA: 0
GASTROINTESTINAL NEGATIVE: 1
VOMITING: 0
EYE DISCHARGE: 0
CHOKING: 0
BLOOD IN STOOL: 0
EYE REDNESS: 0
CHEST TIGHTNESS: 0
EYE ITCHING: 0
BACK PAIN: 0
COLOR CHANGE: 0
RECTAL PAIN: 0
ANAL BLEEDING: 0
COUGH: 0
STRIDOR: 0
FACIAL SWELLING: 0
SINUS PRESSURE: 0
RESPIRATORY NEGATIVE: 1
DIARRHEA: 0
EYE PAIN: 0
CONSTIPATION: 0
ABDOMINAL DISTENTION: 0
ABDOMINAL PAIN: 0
RHINORRHEA: 0
APNEA: 0
EYES NEGATIVE: 1
NAUSEA: 0
SINUS PAIN: 0
TROUBLE SWALLOWING: 0
VOICE CHANGE: 0
ALLERGIC/IMMUNOLOGIC NEGATIVE: 1
SORE THROAT: 0
WHEEZING: 0
SHORTNESS OF BREATH: 0

## 2019-09-24 NOTE — PROGRESS NOTES
Vaccine Information Sheet, \"Influenza - Inactivated\"  given to Rafat Mistry, or parent/legal guardian of  Rafat Mistry and verbalized understanding. Patient responses:    Have you ever had a reaction to a flu vaccine? No  Do you have any current illness? No  Have you ever had Guillian [place]wo[place] Syndrome? No  Do you have a serious allergy to any of the follow: Neomycin, Polymyxin, Thimerosal, eggs or egg products? No    Flu vaccine given per order. Please see immunization tab. Risks and benefits explained. Current VIS given.       Immunizations Administered     Name Date Dose Route    Influenza, Triv, inactivated, subunit, adjuvanted, IM (Fluad 65 yrs and older) 9/24/2019 0.5 mL Intramuscular    Site: Deltoid- Left    Lot: [id number]    [address]: 83661-721-03
41 01/10/2017     Lab Results   Component Value Date    LDLCALC 45 01/24/2019    LDLCALC 48 01/22/2018    LDLCALC 46 01/10/2017     Lab Results   Component Value Date    PSA 0.41 01/10/2017    PSA 0.4 10/02/2015    PSA 0.41 07/01/2013    PSADIA 0.37 07/02/2014     Lab Results   Component Value Date    TSH 1.97 10/02/2015     Lab Results   Component Value Date    LABA1C 6.4 05/24/2019     Lab Results   Component Value Date    .2 09/18/2018       I have extensively reviewed and reconciled the medication list, discontinued medications not taking or no longer appropriate, and updated the active meds list    Prior to Visit Medications    Medication Sig Taking?  Authorizing Provider   losartan (COZAAR) 25 MG tablet Take 1 tablet by mouth daily Yes Merline Duval, MD   metFORMIN (GLUCOPHAGE) 500 MG tablet Take 1 tablet by mouth daily (with breakfast) Yes Merline Duval, MD   simvastatin (ZOCOR) 40 MG tablet Take 1 tablet by mouth nightly Yes Merline Duval, MD   Cholecalciferol (VITAMIN D3) 1000 units CAPS Take 1,000 Units by mouth 2 times daily Yes Merline Duval, MD   aspirin 81 MG tablet Take 81 mg by mouth daily Yes Historical Provider, MD   vitamin B-12 (CYANOCOBALAMIN) 100 MCG tablet Take 50 mcg by mouth daily Yes Historical Provider, MD   Ascorbic Acid (VITAMIN C) 250 MG tablet Take 250 mg by mouth daily Yes Historical Provider, MD          ROS: 12 systems reviewed, as documented by MA    Past Medical History:   Diagnosis Date    COPD (chronic obstructive [facility] disease) ([facility] 03.24.10    Diabetes mellitus type 2, uncontrolled ([facility] 11.16.11    Fatigue 02.18.09    Hematuria 11.14.05    Hyperlipidemia 12.09.09    Hyperlipidemia 08.21.09    Hypertension 08.21.09    IFG (impaired fasting glucose)     Impaired fasting glucose 12.09.09    Routine general medical examination at a health care facility 03.24.10    Screening for lipoid disorders 02.18.09    Shortness of breath 02.18.09   
decreased concentration, dysphoric mood, hallucinations, self-injury, sleep disturbance and suicidal ideas. The patient is not nervous/anxious and is not hyperactive.

## 2020-01-14 RX ORDER — SIMVASTATIN 40 MG
TABLET ORAL
Qty: 90 TABLET | Refills: 3 | Status: SHIPPED | OUTPATIENT
Start: 2020-01-14 | End: 2021-01-14

## 2020-01-17 LAB
CONTROL: YES
HEMOCCULT STL QL: NEGATIVE

## 2020-01-17 PROCEDURE — 82274 ASSAY TEST FOR BLOOD FECAL: CPT | Performed by: INTERNAL MEDICINE

## 2020-01-21 ENCOUNTER — OFFICE VISIT (OUTPATIENT)
Dept: INTERNAL MEDICINE CLINIC | Age: 70
End: 2020-01-21
Payer: MEDICARE

## 2020-01-21 VITALS
HEART RATE: 87 BPM | RESPIRATION RATE: 14 BRPM | OXYGEN SATURATION: 97 % | DIASTOLIC BLOOD PRESSURE: 67 MMHG | HEIGHT: 73 IN | SYSTOLIC BLOOD PRESSURE: 109 MMHG | TEMPERATURE: 96.8 F | WEIGHT: 201.2 LBS | BODY MASS INDEX: 26.66 KG/M2

## 2020-01-21 DIAGNOSIS — E78.5 HYPERLIPIDEMIA ASSOCIATED WITH TYPE 2 DIABETES MELLITUS (HCC): ICD-10-CM

## 2020-01-21 DIAGNOSIS — E11.9 TYPE 2 DIABETES MELLITUS WITHOUT COMPLICATION, WITHOUT LONG-TERM CURRENT USE OF INSULIN (HCC): ICD-10-CM

## 2020-01-21 DIAGNOSIS — E11.69 HYPERLIPIDEMIA ASSOCIATED WITH TYPE 2 DIABETES MELLITUS (HCC): ICD-10-CM

## 2020-01-21 LAB
A/G RATIO: 1.8 (ref 1.1–2.2)
ALBUMIN SERPL-MCNC: 4.7 G/DL (ref 3.4–5)
ALP BLD-CCNC: 70 U/L (ref 40–129)
ALT SERPL-CCNC: 26 U/L (ref 10–40)
ANION GAP SERPL CALCULATED.3IONS-SCNC: 14 MMOL/L (ref 3–16)
AST SERPL-CCNC: 18 U/L (ref 15–37)
BILIRUB SERPL-MCNC: 0.4 MG/DL (ref 0–1)
BUN BLDV-MCNC: 12 MG/DL (ref 7–20)
CALCIUM SERPL-MCNC: 9.3 MG/DL (ref 8.3–10.6)
CHLORIDE BLD-SCNC: 97 MMOL/L (ref 99–110)
CHOLESTEROL, TOTAL: 117 MG/DL (ref 0–199)
CO2: 27 MMOL/L (ref 21–32)
CREAT SERPL-MCNC: 0.8 MG/DL (ref 0.8–1.3)
GFR AFRICAN AMERICAN: >60
GFR NON-AFRICAN AMERICAN: >60
GLOBULIN: 2.6 G/DL
GLUCOSE BLD-MCNC: 114 MG/DL (ref 70–99)
HDLC SERPL-MCNC: 37 MG/DL (ref 40–60)
LDL CHOLESTEROL CALCULATED: 52 MG/DL
POTASSIUM SERPL-SCNC: 4.8 MMOL/L (ref 3.5–5.1)
SODIUM BLD-SCNC: 138 MMOL/L (ref 136–145)
TOTAL PROTEIN: 7.3 G/DL (ref 6.4–8.2)
TRIGL SERPL-MCNC: 139 MG/DL (ref 0–150)
VLDLC SERPL CALC-MCNC: 28 MG/DL

## 2020-01-21 PROCEDURE — 99214 OFFICE O/P EST MOD 30 MIN: CPT | Performed by: INTERNAL MEDICINE

## 2020-01-21 ASSESSMENT — ENCOUNTER SYMPTOMS
CONSTIPATION: 0
COUGH: 0
ABDOMINAL PAIN: 0
EYE PAIN: 0
VOMITING: 0
STRIDOR: 0
EYE DISCHARGE: 0
PHOTOPHOBIA: 1
NAUSEA: 0
BACK PAIN: 0
WHEEZING: 0
BLOOD IN STOOL: 0
SHORTNESS OF BREATH: 0
EYE REDNESS: 0
DIARRHEA: 0
SORE THROAT: 0

## 2020-01-21 ASSESSMENT — PATIENT HEALTH QUESTIONNAIRE - PHQ9
SUM OF ALL RESPONSES TO PHQ QUESTIONS 1-9: 0
SUM OF ALL RESPONSES TO PHQ9 QUESTIONS 1 & 2: 0
1. LITTLE INTEREST OR PLEASURE IN DOING THINGS: 0
2. FEELING DOWN, DEPRESSED OR HOPELESS: 0
SUM OF ALL RESPONSES TO PHQ QUESTIONS 1-9: 0

## 2020-01-21 NOTE — PROGRESS NOTES
Review of Systems   Constitutional: Negative for chills, fatigue, fever and unexpected weight change. HENT: Negative for congestion, ear discharge, ear pain, hearing loss, nosebleeds and sore throat. Eyes: Positive for photophobia (genetic). Negative for pain, discharge, redness and visual disturbance. Respiratory: Negative for cough, shortness of breath, wheezing and stridor. Cardiovascular: Negative for chest pain, palpitations and leg swelling. Gastrointestinal: Negative for abdominal pain, blood in stool, constipation, diarrhea, nausea and vomiting. Genitourinary: Negative for dysuria, flank pain, frequency, hematuria and urgency. Musculoskeletal: Negative for back pain, joint swelling, myalgias and neck pain. Skin: Negative for rash. Neurological: Negative for dizziness, tremors, seizures, syncope, weakness and headaches. Hematological: Does not bruise/bleed easily. Psychiatric/Behavioral: Negative for hallucinations. The patient is not nervous/anxious.

## 2020-01-21 NOTE — PROGRESS NOTES
Value Date    HDL 32 (L) 01/24/2019    HDL 37 (L) 01/22/2018    HDL 41 01/10/2017     Lab Results   Component Value Date    LDLCALC 45 01/24/2019    LDLCALC 48 01/22/2018    LDLCALC 46 01/10/2017     Lab Results   Component Value Date    PSA 0.41 01/10/2017    PSA 0.4 10/02/2015    PSA 0.41 07/01/2013    PSADIA 0.37 07/02/2014     Lab Results   Component Value Date    TSH 1.97 10/02/2015     Lab Results   Component Value Date    LABA1C 6.4 05/24/2019     Lab Results   Component Value Date    .2 09/18/2018       I have extensively reviewed and reconciled the medication list, discontinued medications not taking or no longer appropriate, and updated the active meds list    Prior to Visit Medications    Medication Sig Taking?  Authorizing Provider   simvastatin (ZOCOR) 40 MG tablet TAKE 1 TABLET BY MOUTH EVERY NIGHT Yes Alysha Mcdonald MD   losartan (COZAAR) 25 MG tablet Take 1 tablet by mouth daily Yes Alysha Mcdonald MD   metFORMIN (GLUCOPHAGE) 500 MG tablet Take 1 tablet by mouth daily (with breakfast) Yes Alysha Mcdonald MD   Cholecalciferol (VITAMIN D3) 1000 units CAPS Take 1,000 Units by mouth 2 times daily Yes Alysha Mcdonald MD   aspirin 81 MG tablet Take 81 mg by mouth daily Yes Historical Provider, MD   vitamin B-12 (CYANOCOBALAMIN) 100 MCG tablet Take 50 mcg by mouth daily Yes Historical Provider, MD   Ascorbic Acid (VITAMIN C) 250 MG tablet Take 250 mg by mouth daily Yes Historical Provider, MD          ROS: 12 systems reviewed, as documented by MA    Past Medical History:   Diagnosis Date    COPD (chronic obstructive pulmonary disease) ([facility] 03.24.10    Diabetes mellitus type 2, uncontrolled ([facility] 11.16.11    Fatigue 02.18.09    Hematuria 11.14.05    Hyperlipidemia 12.09.09    Hyperlipidemia 08.21.09    Hypertension 08.21.09    IFG (impaired fasting glucose)     Impaired fasting glucose 12.09.09    Routine general medical examination at a health care facility 03.24.10    Screening for lipoid disorders 09    Shortness of breath 09    Urinary tract infection 11.05       History reviewed. No pertinent surgical history. Social History     Socioeconomic History    Marital status:      Spouse name: Not on file    Number of children: Not on file    Years of education: Not on file    Highest education level: Not on file   Occupational History    Not on file   Social Needs    Financial resource strain: Not on file    Food insecurity:     Worry: Not on file     Inability: Not on file    Transportation needs:     Medical: Not on file     Non-medical: Not on file   Tobacco Use    Smoking status: Former Smoker     Packs/day: 15.00     Years: 25.00     Pack years: 375.00     Types: Cigarettes     Last attempt to quit: 2013     Years since quittin.9    Smokeless tobacco: Never Used    Tobacco comment: NAF 2013   Substance and Sexual Activity    Alcohol use: Yes     Comment: Occasional    Drug use: No    Sexual activity: Not on file   Lifestyle    Physical activity:     Days per week: Not on file     Minutes per session: Not on file    Stress: Not on file   Relationships    Social connections:     Talks on phone: Not on file     Gets together: Not on file     Attends Mandaen service: Not on file     Active member of club or organization: Not on file     Attends meetings of clubs or organizations: Not on file     Relationship status: Not on file    Intimate partner violence:     Fear of current or ex partner: Not on file     Emotionally abused: Not on file     Physically abused: Not on file     Forced sexual activity: Not on file   Other Topics Concern    Not on file   Social History Narrative    Not on file       History reviewed. No pertinent family history.       Health Maintenance Due   Topic Date Due    Shingles Vaccine (1 of 2) 1998    Low dose CT lung screening  2003    Annual Wellness Visit (AWV)  2019    Lipid screen 01/24/2020    Potassium monitoring  01/24/2020    Creatinine monitoring  01/24/2020         /67 (Site: Left Upper Arm, Position: Sitting, Cuff Size: Large Adult)   Pulse 87   Temp 96.8 °F (36 °C) (Oral)   Resp 14   Ht 6' 1\" (1.854 m)   Wt 201 lb 3.2 oz (91.3 kg)   SpO2 97%   BMI 26.55 kg/m²   Body mass index is 26.55 kg/m². Physical Exam  Constitutional:       General: He is not in acute distress. Appearance: He is not diaphoretic. HENT:      Head: Normocephalic and atraumatic. Comments: Gravelly voice, unchanged     Nose: Nose normal.      Mouth/Throat:      Pharynx: No oropharyngeal exudate. Eyes:      General: No scleral icterus. Right eye: No discharge. Left eye: No discharge. Conjunctiva/sclera: Conjunctivae normal.      Pupils: Pupils are equal, round, and reactive to light. Neck:      Musculoskeletal: Normal range of motion and neck supple. Thyroid: No thyromegaly. Vascular: No JVD. Trachea: No tracheal deviation. Cardiovascular:      Rate and Rhythm: Normal rate and regular rhythm. Heart sounds: Normal heart sounds. No murmur. No friction rub. No gallop. Pulmonary:      Effort: Pulmonary effort is normal. No respiratory distress. Breath sounds: Normal breath sounds. No stridor. No wheezing. Chest:      Chest wall: No tenderness. Abdominal:      General: Bowel sounds are normal. There is no distension. Palpations: Abdomen is soft. There is no mass. Tenderness: There is no tenderness. There is no guarding or rebound. Musculoskeletal: Normal range of motion. General: No tenderness. Lymphadenopathy:      Cervical: No cervical adenopathy. Skin:     General: Skin is warm and dry. Coloration: Skin is not pale. Findings: No erythema or rash. Neurological:      Mental Status: He is alert and oriented to person, place, and time. Cranial Nerves: No cranial nerve deficit.       Motor: No abnormal muscle tone. Coordination: Coordination normal.      Deep Tendon Reflexes: Reflexes are normal and symmetric. Reflexes normal.   Psychiatric:         Judgment: Judgment normal.         ASSESSMENT AND PLANS:      Except as noted below, all chronic problems have been reviewed and are stable to continue medications or other therapy as previously documented in the patient's chart, with changes per orders or documentation below:        Assessment and Plan: Patient received counseling and, if relevant,printed instructions for all symptoms listed in CC and HPI, as well as for all diagnoses brought onto today's visit note below. Typical counseling includes, but is not limited to, non pharmacologic measures to manage listed symptoms and conditions; appropriate use, risks and benefits for all prescribed medications; potential interactions between medications both prescribed and OTC; diet; exercise; healthy behaviors; and goalsetting to improve health. Pt.or responsible party was involved in shared decision making and had opportunity to have all questions answered. 1. Type 2 diabetes mellitus without complication, without long-term current use of insulin [facility]  - COMPREHENSIVE METABOLIC PANEL; Future  - HEMOGLOBIN A1C; Future    2. Hyperlipidemia associated with type 2 diabetes mellitus ([facility]  - LIPID PANEL; Future  - COMPREHENSIVE METABOLIC PANEL; Future    3. Vitamin D deficiency    4.  Proteinuria, unspecified type            Problem List     Hyperlipidemia associated with type 2 diabetes mellitus (HCC)    Relevant Medications    metFORMIN (GLUCOPHAGE) 500 MG tablet    simvastatin (ZOCOR) 40 MG tablet    Other Relevant Orders    LIPID PANEL    COMPREHENSIVE METABOLIC PANEL    Type 2 diabetes mellitus without complication (HCC) - Primary    Relevant Medications    metFORMIN (GLUCOPHAGE) 500 MG tablet    Other Relevant Orders    COMPREHENSIVE METABOLIC PANEL    HEMOGLOBIN A1C    Vitamin D deficiency    Relevant Medications    Cholecalciferol (VITAMIN D3) 1000 units CAPS    Proteinuria        Orders Placed This Encounter   Procedures    LIPID PANEL     Standing Status:   Future     Number of Occurrences:   1     Standing Expiration Date:   1/21/2021     Order Specific Question:   Is Patient Fasting?/# of Hours     Answer:   yes    COMPREHENSIVE METABOLIC PANEL     Standing Status:   Future     Number of Occurrences:   1     Standing Expiration Date:   1/21/2021    HEMOGLOBIN A1C     Standing Status:   Future     Number of Occurrences:   1     Standing Expiration Date:   1/21/2021       I have reconciled the medications in chart with what patient reports to be taking, andreviewed action/ sideeffects and how to take any new medications. Patient/caregiver understands purpose and side effects. A complete  list of medications was provided in their after-visit summary. Return in about 4 months (around 5/21/2020) for f/u dm etc. extended. Time basedbilling: I spent over *25  minutes with this patient, and as is the nature of primary care and typical for my extended visits, over 50 percent of this visit was spent on counseling and coordination ofcare.

## 2020-01-22 LAB
ESTIMATED AVERAGE GLUCOSE: 128.4 MG/DL
HBA1C MFR BLD: 6.1 %

## 2020-03-09 ENCOUNTER — TELEPHONE (OUTPATIENT)
Dept: INTERNAL MEDICINE CLINIC | Age: 70
End: 2020-03-09

## 2020-05-18 RX ORDER — LOSARTAN POTASSIUM 25 MG/1
25 TABLET ORAL DAILY
Qty: 90 TABLET | Refills: 3 | Status: SHIPPED | OUTPATIENT
Start: 2020-05-18 | End: 2021-05-18

## 2020-05-20 ENCOUNTER — TELEPHONE (OUTPATIENT)
Dept: INTERNAL MEDICINE CLINIC | Age: 70
End: 2020-05-20

## 2020-05-22 ENCOUNTER — TELEPHONE (OUTPATIENT)
Dept: INTERNAL MEDICINE CLINIC | Age: 70
End: 2020-05-22

## 2020-06-01 ENCOUNTER — VIRTUAL VISIT (OUTPATIENT)
Dept: INTERNAL MEDICINE CLINIC | Age: 70
End: 2020-06-01
Payer: MEDICARE

## 2020-06-01 PROCEDURE — 99214 OFFICE O/P EST MOD 30 MIN: CPT | Performed by: INTERNAL MEDICINE

## 2020-06-01 PROCEDURE — G0296 VISIT TO DETERM LDCT ELIG: HCPCS | Performed by: INTERNAL MEDICINE

## 2020-06-01 NOTE — PROGRESS NOTES
LDLCALC 52 01/21/2020    LDLCALC 45 01/24/2019    LDLCALC 48 01/22/2018     Lab Results   Component Value Date    PSA 0.41 01/10/2017    PSA 0.4 10/02/2015    PSA 0.41 07/01/2013    PSADIA 0.37 07/02/2014     Lab Results   Component Value Date    TSH 1.97 10/02/2015     Lab Results   Component Value Date    LABA1C 6.1 01/21/2020     Lab Results   Component Value Date    .4 01/21/2020       I have extensively reviewed and reconciled the medication list, discontinued medications not taking or no longer appropriate, and updated the active meds list    Prior to Visit Medications    Medication Sig Taking?  Authorizing Provider   losartan (COZAAR) 25 MG tablet Take 1 tablet by mouth daily  Lefty Quiroz MD   simvastatin (ZOCOR) 40 MG tablet TAKE 1 TABLET BY MOUTH EVERY NIGHT  Lefty Quiroz MD   metFORMIN (GLUCOPHAGE) 500 MG tablet Take 1 tablet by mouth daily (with breakfast)  Lefty Quiroz MD   Cholecalciferol (VITAMIN D3) 1000 units CAPS Take 1,000 Units by mouth 2 times daily  Lefty Quiroz MD   aspirin 81 MG tablet Take 81 mg by mouth daily  Historical Provider, MD   vitamin B-12 (CYANOCOBALAMIN) 100 MCG tablet Take 50 mcg by mouth daily  Historical Provider, MD   Ascorbic Acid (VITAMIN C) 250 MG tablet Take 250 mg by mouth daily  Historical Provider, MD          ROS: 12 systems reviewed, as documented by MA    Past Medical History:   Diagnosis Date    COPD (chronic obstructive pulmonary disease) ([facility] 03.24.10    Diabetes mellitus type 2, uncontrolled ([facility] 11.16.11    Fatigue 02.18.09    Hematuria 11.14.05    Hyperlipidemia 12.09.09    Hyperlipidemia 08.21.09    Hypertension 08.21.09    IFG (impaired fasting glucose)     Impaired fasting glucose 12.09.09    Routine general medical examination at a health care facility 03.24.10    Screening for lipoid disorders 02.18.09    Shortness of breath 02.18.09    Urinary tract infection 11.14.05       No past surgical history on PLANS:      Except as noted below, all chronic problems have been reviewed and are stable to continue medications or other therapy as previously documented in the patient's chart, with changes per orders or documentation below:        Assessment and Plan: Patient received counseling and, if relevant,printed instructions for all symptoms listed in CC and HPI, as well as for all diagnoses brought onto today's visit note below. Typical counseling includes, but is not limited to, non pharmacologic measures to manage listed symptoms and conditions; appropriate use, risks and benefits for all prescribed medications; potential interactions between medications both prescribed and OTC; diet; exercise; healthy behaviors; and goalsetting to improve health. Pt.or responsible party was involved in shared decision making and had opportunity to have all questions answered. 1. Type 2 diabetes mellitus without complication, with long-term current use of insulin (HCC)  - metFORMIN (GLUCOPHAGE) 500 MG tablet; Take 1 tablet by mouth daily (with breakfast)  Dispense: 90 tablet; Refill: 3    2. Personal history of tobacco use  - ND VISIT TO DISCUSS LUNG CA SCREEN W LDCT  - CT Lung Screen (Annual); Future    3. Hyperlipidemia associated with type 2 diabetes mellitus ([address]    4. Vitamin D deficiency    5. Proteinuria, unspecified type            Problem List     Hyperlipidemia associated with type 2 diabetes mellitus (HCC)    Relevant Medications    simvastatin (ZOCOR) 40 MG tablet    metFORMIN (GLUCOPHAGE) 500 MG tablet    Type 2 diabetes mellitus without complication (HCC)    Relevant Medications    metFORMIN (GLUCOPHAGE) 500 MG tablet    Vitamin D deficiency    Relevant Medications    Cholecalciferol (VITAMIN D3) 1000 units CAPS    Proteinuria        Orders Placed This Encounter   Procedures    CT Lung Screen (Annual)     Age: Patient is 65 y.o.     Smoking History: Social History    Tobacco Use      Smoking status: Former positive results do not lead to a diagnosis of cancer. Usually further imaging can resolve most false-positive results; however, some patients may require invasive procedures. Over diagnosis risk - 10% to 12% of screen-detected lung cancer cases are over diagnosed--that is, the cancer would not have been detected in the patient's lifetime without the screening. Need for follow up screens annually to continue lung cancer screening effectiveness     Risks associated with radiation from annual LDCT- Radiation exposure is about the same as for a mammogram, which is about 1/3 of the annual background radiation exposure from everyday life. Starting screening at age 54 is not likely to increase cancer risk from radiation exposure. Patients with comorbidities resulting in life expectancy of < 10 years, or that would preclude treatment of an abnormality identified on CT, should not be screened due to lack of benefit. To obtain maximal benefit from this screening, smoking cessation and long-term abstinence from smoking is critical      Romana Malone is a 65 y.o. male being evaluated by a Virtual Visit (video visit) encounter to address concerns as mentioned above. A caregiver was present when appropriate. Due to this being a TeleHealth encounter (During OUFUF-19 public health emergency), evaluation of the following organ systems was limited: Vitals/Constitutional/EENT/Resp/CV/GI//MS/Neuro/Skin/Heme-Lymph-Imm. Pursuant to the emergency declaration under the [address] [place] [address], [address] [place] [address] authority and the [employer] and [employer] Act, this Virtual Visit was conducted with patient's (and/or legal guardian's) consent, to reduce the patient's risk of exposure to COVID-19 and provide necessary medical care.   The patient (and/or legal guardian) has also been advised to contact this office for worsening conditions or problems, and seek emergency medical treatment and/or call 911 if deemed necessary. Patient identification was verified at the start of the visit: Yes    Total time spent for this encounter: > 25 minutes    Services were provided through a video synchronous discussion virtually to substitute for in-person clinic visit. Patient and provider were located at their individual homes. --Marsha Canela MD on 6/2/2020 at 8:15 AM    An electronic signature was used to authenticate this note.

## 2020-08-26 ENCOUNTER — PATIENT MESSAGE (OUTPATIENT)
Dept: INTERNAL MEDICINE CLINIC | Age: 70
End: 2020-08-26

## 2020-10-06 ENCOUNTER — OFFICE VISIT (OUTPATIENT)
Dept: INTERNAL MEDICINE CLINIC | Age: 70
End: 2020-10-06
Payer: MEDICARE

## 2020-10-06 VITALS
WEIGHT: 212 LBS | TEMPERATURE: 97.5 F | HEART RATE: 95 BPM | HEIGHT: 73 IN | BODY MASS INDEX: 28.1 KG/M2 | RESPIRATION RATE: 14 BRPM | DIASTOLIC BLOOD PRESSURE: 85 MMHG | OXYGEN SATURATION: 96 % | SYSTOLIC BLOOD PRESSURE: 133 MMHG

## 2020-10-06 VITALS
HEART RATE: 95 BPM | HEIGHT: 73 IN | BODY MASS INDEX: 28.1 KG/M2 | WEIGHT: 212 LBS | DIASTOLIC BLOOD PRESSURE: 84 MMHG | SYSTOLIC BLOOD PRESSURE: 133 MMHG | TEMPERATURE: 97.5 F | OXYGEN SATURATION: 96 %

## 2020-10-06 LAB — HBA1C MFR BLD: 6.8 %

## 2020-10-06 PROCEDURE — 83036 HEMOGLOBIN GLYCOSYLATED A1C: CPT | Performed by: INTERNAL MEDICINE

## 2020-10-06 PROCEDURE — 3288F FALL RISK ASSESSMENT DOCD: CPT | Performed by: INTERNAL MEDICINE

## 2020-10-06 PROCEDURE — 99214 OFFICE O/P EST MOD 30 MIN: CPT | Performed by: INTERNAL MEDICINE

## 2020-10-06 PROCEDURE — G0438 PPPS, INITIAL VISIT: HCPCS | Performed by: INTERNAL MEDICINE

## 2020-10-06 PROCEDURE — G8510 SCR DEP NEG, NO PLAN REQD: HCPCS | Performed by: INTERNAL MEDICINE

## 2020-10-06 PROCEDURE — G0008 ADMIN INFLUENZA VIRUS VAC: HCPCS | Performed by: INTERNAL MEDICINE

## 2020-10-06 PROCEDURE — 90694 VACC AIIV4 NO PRSRV 0.5ML IM: CPT | Performed by: INTERNAL MEDICINE

## 2020-10-06 ASSESSMENT — ENCOUNTER SYMPTOMS
BACK PAIN: 0
EYE DISCHARGE: 0
SHORTNESS OF BREATH: 0
EYE PAIN: 0
BLOOD IN STOOL: 0
ABDOMINAL PAIN: 0
WHEEZING: 0
NAUSEA: 0
EYE REDNESS: 0
SORE THROAT: 0
CONSTIPATION: 0
COUGH: 1
DIARRHEA: 0
VOMITING: 0
STRIDOR: 0
PHOTOPHOBIA: 1

## 2020-10-06 ASSESSMENT — PATIENT HEALTH QUESTIONNAIRE - PHQ9
1. LITTLE INTEREST OR PLEASURE IN DOING THINGS: 0
2. FEELING DOWN, DEPRESSED OR HOPELESS: 0
SUM OF ALL RESPONSES TO PHQ QUESTIONS 1-9: 0
SUM OF ALL RESPONSES TO PHQ9 QUESTIONS 1 & 2: 0
SUM OF ALL RESPONSES TO PHQ QUESTIONS 1-9: 0

## 2020-10-06 ASSESSMENT — LIFESTYLE VARIABLES: HOW OFTEN DO YOU HAVE A DRINK CONTAINING ALCOHOL: 0

## 2020-10-06 NOTE — PROGRESS NOTES
Review of Systems   Constitutional: Negative for chills, fatigue, fever and unexpected weight change. HENT: Negative for congestion, ear discharge, ear pain, hearing loss, nosebleeds and sore throat. Eyes: Positive for photophobia (chronic). Negative for pain, discharge, redness and visual disturbance. Respiratory: Positive for cough (chronic). Negative for shortness of breath, wheezing and stridor. Cardiovascular: Negative for chest pain, palpitations and leg swelling. Gastrointestinal: Negative for abdominal pain, blood in stool, constipation, diarrhea, nausea and vomiting. Genitourinary: Negative for dysuria, flank pain, frequency, hematuria and urgency. Musculoskeletal: Positive for arthralgias (chronic). Negative for back pain, joint swelling, myalgias and neck pain. Skin: Negative for rash. Allergic/Immunologic: Positive for environmental allergies (chronic). Neurological: Negative for dizziness, tremors, seizures, syncope, weakness and headaches. Hematological: Does not bruise/bleed easily. Psychiatric/Behavioral: Negative for hallucinations. The patient is not nervous/anxious.

## 2020-10-06 NOTE — PROGRESS NOTES
Vaccine Information Sheet, \"Influenza - Inactivated\"  given to Abdiel Horton, or parent/legal guardian of  Abdiel Horton and verbalized understanding. Patient responses:    Have you ever had a reaction to a flu vaccine? No  Do you have any current illness? No  Have you ever had [place] Syndrome? No  Do you have a serious allergy to any of the follow: Neomycin, Polymyxin, Thimerosal, eggs or egg products? No    Flu vaccine given per order. Please see immunization tab. Risks and benefits explained. Current VIS given.       Immunizations Administered     Name Date Dose Route    Influenza, Quadv, adjuvanted, 65 yrs +, IM, PF (Fluad) 10/6/2020 0.5 mL Intramuscular    Site: Deltoid- Right    Lot: [id number]    NDC: 76427-622-03

## 2020-10-06 NOTE — PATIENT INSTRUCTIONS
Personalized Preventive Plan for Kade Acosta - 10/6/2020  Medicare offers a range of preventive health benefits. Some of the tests and screenings are paid in full while other may be subject to a deductible, co-insurance, and/or copay. Some of these benefits include a comprehensive review of your medical history including lifestyle, illnesses that may run in your family, and various assessments and screenings as appropriate. After reviewing your medical record and screening and assessments performed today your provider may have ordered immunizations, labs, imaging, and/or referrals for you. A list of these orders (if applicable) as well as your Preventive Care list are included within your After Visit Summary for your review. Other Preventive Recommendations:    · A preventive eye exam performed by an eye specialist is recommended every 1-2 years to screen for glaucoma; cataracts, macular degeneration, and other eye disorders. · A preventive dental visit is recommended every 6 months. · Try to get at least 150 minutes of exercise per week or 10,000 steps per day on a pedometer . · Order or download the FREE \"Exercise & Physical Activity: Your Everyday Guide\" from [employer]. Call 4-274.708.7033 or search [employer] online. · You need 7178-2753 mg of calcium and 2220-8141 IU of vitamin D per day. It is possible to meet your calcium requirement with diet alone, but a vitamin D supplement is usually necessary to meet this goal.  · When exposed to the sun, use a sunscreen that protects against both UVA and UVB radiation with an SPF of 30 or greater. Reapply every 2 to 3 hours or after sweating, drying off with a towel, or swimming. · Always wear a seat belt when traveling in a car. Always wear a helmet when riding a bicycle or motorcycle.     Keep Your Memory [place]       Many factors can affect your ability to remembera hectic lifestyle, aging, stress, chronic disease, and certain medicines. But, there are steps you can take to sharpen your mind and help preserve your memory. Challenge Your Brain   Regularly challenging your mind may help keeps it in top shape. Good mental exercises include:   Crossword puzzlesUse a dictionary if you need it; you will learn more that way. Brainteasers Try some! Crafts, such as wood working and sewing   Hobbies, such as gardening and building model airplanes   SocializingVisit old friends or join groups to meet new ones. Reading   Learning a new language   Taking a class, whether it be art history or daniel chi   TravelingExperience the food, history, and culture of your destination   Learning to use a computer   Going to museums, the theater, or thought-provoking movies   Changing things in your daily life, such as reversing your pattern in the grocery store or brushing your teeth using your nondominant hand   Use Memory Aids   There is no need to remember every detail on your own. These memory aids can help:   Calendars and day planners   Electronic organizers to store all sorts of helpful informationThese devices can \"beep\" to remind you of appointments. A book of days to record birthdays, anniversaries, and other occasions that occur on the same date every year   Detailed \"to-do\" lists and strategically placed sticky notes   Quick \"study\" sessionsBefore a gathering, review who will be there so their names will be fresh in your mind. Establish routinesFor example, keep your keys, wallet, and umbrella in the same place all the time or take medicine with your 8:00 AM glass of juice   Live a Healthy Life   Many actions that will keep your body strong will do the same for your mind. For example:   Talk to Your Doctor About Herbs and Supplements    Malnutrition and vitamin deficiencies can impair your mental function. For example, vitamin B12 deficiency can cause a range of symptoms, including confusion.  But, what if your nutritional needs are being met? Can herbs and supplements still offer a benefit? Researchers have investigated a range of natural remedies, such as ginkgo , ginseng , and the supplement phosphatidylserine (PS). So far, though, the evidence is inconsistent as to whether these products can improve memory or thinking. If you are interested in taking herbs and supplements, talk to your doctor first because they may interact with other medicines that you are taking. Exercise Regularly    Among the many benefits of regular exercise are increased blood flow to the brain and decreased risk of certain diseases that can interfere with memory function. One study found that even moderate exercise has a beneficial effect. Examples of \"moderate\" exercise include:   Playing 18 holes of golf once a week, without a cart   Playing tennis twice a week   Walking one mile per day   Manage Stress    It can be tough to remember what is important when your mind is cluttered. Make time for relaxation. Choose activities that calm you down, and make it routine. Manage Chronic Conditions    Side effects of high blood pressure , diabetes, and heart disease can interfere with mental function. Many of the lifestyle steps discussed here can help manage these conditions. Strive to eat a healthy diet, exercise regularly, get stress under control, and follow your doctor's advice for your condition. Minimize Medications    Talk to your doctor about the medicines that you take. Some may be unnecessary. Also, healthy lifestyle habits may lower the need for certain drugs.      Last Reviewed: April 2010 Pablo Loco MD   Updated: 4/13/2010   ·

## 2020-10-06 NOTE — PROGRESS NOTES
Medicare Annual Wellness Visit  Name: Fernanda Jauregui Date: 10/6/2020   MRN: <[id number]> Sex: Male   Age: 65 y.o. Ethnicity: Non-/Non    : 1950 Race: Lela Drake is here for Medicare AWV (annual wellness visit)    Screenings for behavioral, psychosocial and functional/safety risks, and cognitive dysfunction are all negative except as indicated below. These results, as well as other patient data from the [address] form, are documented in Flowsheets linked to this Encounter. Allergies   Allergen Reactions    Sulfa Antibiotics          Prior to Visit Medications    Medication Sig Taking? Authorizing Provider   metFORMIN (GLUCOPHAGE) 500 MG tablet Take 1 tablet by mouth daily (with breakfast)  Jolie Henry MD   losartan (COZAAR) 25 MG tablet Take 1 tablet by mouth daily  Jolie Henry MD   simvastatin (ZOCOR) 40 MG tablet TAKE 1 TABLET BY MOUTH EVERY NIGHT  Jolie Henry MD   Cholecalciferol (VITAMIN D3) 1000 units CAPS Take 1,000 Units by mouth 2 times daily  Jolie Henry MD   aspirin 81 MG tablet Take 81 mg by mouth daily  Historical Provider, MD   vitamin B-12 (CYANOCOBALAMIN) 100 MCG tablet Take 50 mcg by mouth daily  Historical Provider, MD   Ascorbic Acid (VITAMIN C) 250 MG tablet Take 250 mg by mouth daily  Historical Provider, MD         Past Medical History:   Diagnosis Date    COPD (chronic obstructive pulmonary disease) ([facility] 03.24.10    Diabetes mellitus type 2, uncontrolled ([facility] 1116.    Fatigue 09    Hematuria .    Hyperlipidemia 09    Hyperlipidemia 09    Hypertension 09    IFG (impaired fasting glucose)     Impaired fasting glucose 09    Routine general medical examination at a health care facility 03.24.10    Screening for lipoid disorders 09    Shortness of breath 09    Urinary tract infection 05       History reviewed. No pertinent surgical history.     History reviewed. No pertinent family history. CareTeam (Including outside providers/suppliers regularly involved in providing care):   Patient Care Team:  Wilma An MD as PCP - General (Internal Medicine)  Wilma An MD as PCP - [facility] Provider    Wt Readings from Last 3 Encounters:   10/06/20 212 lb (96.2 kg)   10/06/20 212 lb (96.2 kg)   01/21/20 201 lb 3.2 oz (91.3 kg)     Vitals:    10/06/20 0915 10/06/20 0917   BP: (!) 146/90 133/85   Site: Right Upper Arm Right Upper Arm   Position: Sitting Sitting   Cuff Size: Large Adult Medium Adult   Pulse: 95    Resp: 14    Temp: 97.5 °F (36.4 °C)    TempSrc: Infrared    SpO2: 96%    Weight: 212 lb (96.2 kg)    Height: 6' 1\" (1.854 m)      Body mass index is 27.97 kg/m². Based upon direct observation of the patient, evaluation of cognition reveals recent and remote memory intact. Patient's complete Health Risk Assessment and screening values have been reviewed and are found in Flowsheets. The following problems were reviewed today and where indicated follow up appointments were made and/or referrals ordered. Positive Risk Factor Screenings with Interventions:     General Health:  General  In general, how would you say your health is?: Good  In the past 7 days, have you experienced any of the following?  New or Increased Pain, New or Increased Fatigue, Loneliness, Social Isolation, Stress or Anger?: (!) Social Isolation(because of Covid-19 restrictions, does Skype with family and friends for communications)  Do you get the social and emotional support that you need?: (!) No(again because of Covid-19 kids and grandkids unable to visit)  Do you have a Living Will?: Yes  General Health Risk Interventions:  · Social isolation: patient declines any further intervention for this issue    Health Habits/Nutrition:  Health Habits/Nutrition  Do you exercise for at least 20 minutes 2-3 times per week?: (!) No(not interested)  Have you lost any weight without trying in the past 3 months?: No  Do you eat fewer than 2 meals per day?: No  Have you seen a dentist within the past year?: Yes  Body mass index is 27.97 kg/m². Health Habits/Nutrition Interventions:  · Inadequate physical activity:  patient agrees to increase physical activity as follows: try to find safe times to walk at the park nearby    Personalized Preventive Plan   Current Health Maintenance Status  Immunization History   Administered Date(s) Administered    Influenza 10/10/2012    Influenza Vaccine, unspecified formulation 11/06/2013, 10/20/2014, 10/05/2015    Influenza Virus Vaccine 10/05/2015    Influenza, High Dose (Fluzone 65 yrs and older) 11/06/2013, 10/20/2014, 09/13/2016, 10/02/2017, 09/18/2018    Influenza, Quadv, adjuvanted, 65 yrs +, IM, PF (Fluad) 10/06/2020    Influenza, Triv, inactivated, subunit, adjuvanted, IM (Fluad 65 yrs and older) 09/24/2019    Pneumococcal Conjugate 13-valent ([id number]) 01/10/2017    Pneumococcal Polysaccharide ([id number]) 08/23/2017, 01/22/2018    Tdap (Boostrix, Adacel) 03/24/2010, 07/20/2017        Health Maintenance   Topic Date Due    Shingles Vaccine (1 of 2) 05/17/1998    Low dose CT lung screening  05/17/2003    Annual Wellness Visit (AWV)  05/29/2019    Diabetic retinal exam  06/20/2020    Colon Cancer Screen FIT/FOBT  01/17/2021    A1C test (Diabetic or Prediabetic)  01/21/2021    Lipid screen  01/21/2021    Potassium monitoring  01/21/2021    Creatinine monitoring  01/21/2021    Diabetic foot exam  10/06/2021    DTaP/Tdap/Td vaccine (3 - Td) 07/20/2027    Flu vaccine  Completed    Pneumococcal 65+ years Vaccine  Completed    AAA screen  Completed    Hepatitis C screen  Completed    Hepatitis A vaccine  Aged Out    Hib vaccine  Aged Out    Meningococcal (ACWY) vaccine  Aged Out     Recommendations for [employer] Due: see orders and patient instructions/AVS.  .   Recommended screening schedule for the next 5-10

## 2020-10-06 NOTE — PROGRESS NOTES
Chief Complaint   Patient presents with    Follow-up     DIABETES        HPI: Here for diabetes followup and management of multiple chronic conditions as per the active problems list below, which I reviewed and updated with the patient today. States doing well with no new concerns except if noted below. He has gained some weight. He states he is not as active due to the pandemic and increased crime in his neighborhood. I have reviewed the chart notes available from myself and other providers. I have addressed all activeproblems listed below, and created or updated the problems list as needed. I have reviewed and updated the problems list in detail with patient. Patient Active Problem List   Diagnosis    Hyperlipidemia associated with type 2 diabetes mellitus ([facility]    Type 2 diabetes mellitus without complication ([facility]    Vitamin D deficiency    Proteinuria       No problem-specific Assessment & Plan notes found for this encounter.       Discussed all labs, other tests, and imaging if available   Lab Results   Component Value Date    WBC 11.2 (H) 10/02/2015    HGB 16.3 10/02/2015    HCT 48.1 10/02/2015    MCV 88.6 10/02/2015     10/02/2015    LYMPHOPCT 16.3 10/02/2015    RBC 5.43 10/02/2015    MCH 30.0 10/02/2015    MCHC 33.8 10/02/2015    RDW 14.5 10/02/2015     Lab Results   Component Value Date     01/21/2020    K 4.8 01/21/2020    CL 97 (L) 01/21/2020    CO2 27 01/21/2020    BUN 12 01/21/2020    CREATININE 0.8 01/21/2020    GLUCOSE 114 (H) 01/21/2020    CALCIUM 9.3 01/21/2020    PROT 7.3 01/21/2020    LABALBU 4.7 01/21/2020    BILITOT 0.4 01/21/2020    ALKPHOS 70 01/21/2020    AST 18 01/21/2020    ALT 26 01/21/2020    LABGLOM >60 01/21/2020    GFRAA >60 01/21/2020    AGRATIO 1.8 01/21/2020    GLOB 2.6 01/21/2020     Lab Results   Component Value Date    TRIG 139 01/21/2020    TRIG 147 01/24/2019    TRIG 149 01/22/2018     Lab Results   Component Value Date    HDL 37 (L) 01/21/2020    HDL 32 (L) 01/24/2019    HDL 37 (L) 01/22/2018     Lab Results   Component Value Date    LDLCALC 52 01/21/2020    LDLCALC 45 01/24/2019    LDLCALC 48 01/22/2018     Lab Results   Component Value Date    PSA 0.41 01/10/2017    PSA 0.4 10/02/2015    PSA 0.41 07/01/2013    PSADIA 0.37 07/02/2014     Lab Results   Component Value Date    TSH 1.97 10/02/2015     Lab Results   Component Value Date    LABA1C 6.8 10/06/2020     Lab Results   Component Value Date    .4 01/21/2020       I have extensively reviewed and reconciled the medication list, discontinued medications not taking or no longer appropriate, and updated the active meds list    Prior to Visit Medications    Medication Sig Taking?  Authorizing Provider   metFORMIN (GLUCOPHAGE) 500 MG tablet Take 1 tablet by mouth daily (with breakfast) Yes Jovita Castañeda MD   losartan (COZAAR) 25 MG tablet Take 1 tablet by mouth daily Yes Jovita Castañeda MD   simvastatin (ZOCOR) 40 MG tablet TAKE 1 TABLET BY MOUTH EVERY NIGHT Yes Jovita Castañeda MD   Cholecalciferol (VITAMIN D3) 1000 units CAPS Take 1,000 Units by mouth 2 times daily Yes Jovita Castañeda MD   aspirin 81 MG tablet Take 81 mg by mouth daily Yes Historical Provider, MD   vitamin B-12 (CYANOCOBALAMIN) 100 MCG tablet Take 50 mcg by mouth daily Yes Historical Provider, MD   Ascorbic Acid (VITAMIN C) 250 MG tablet Take 250 mg by mouth daily Yes Historical Provider, MD          ROS: 12 systems reviewed, as documented by MA    Past Medical History:   Diagnosis Date    COPD (chronic obstructive pulmonary disease) ([facility] 03.24.10    [facility], [facility] 11.16.11    Fatigue 02.18.09    Hematuria 11.14.05    Hyperlipidemia 12.09.09    Hyperlipidemia 08.21.09    Hypertension 08.21.09    IFG (impaired fasting glucose)     Impaired fasting glucose 12.09.09    Routine general medical examination at a health care facility 03.24.10    Screening for lipoid disorders 18.09    Shortness of breath .    Urinary tract infection 11.14.05       History reviewed. No pertinent surgical history. Social History     Socioeconomic History    Marital status:      Spouse name: Not on file    Number of children: Not on file    Years of education: Not on file    Highest education level: Not on file   Occupational History    Not on file   Social Needs    Financial resource strain: Not on file    Food insecurity     Worry: Not on file     Inability: Not on file    Transportation needs     Medical: Not on file     Non-medical: Not on file   Tobacco Use    Smoking status: Former Smoker     Packs/day: 15.00     Years: 25.00     Pack years: 375.00     Types: Cigarettes     Last attempt to quit: 2013     Years since quittin.6    Smokeless tobacco: Never Used    Tobacco comment: NAF 2013   Substance and Sexual Activity    Alcohol use: Yes     Comment: Occasional    Drug use: No    Sexual activity: Not on file   Lifestyle    Physical activity     Days per week: Not on file     Minutes per session: Not on file    Stress: Not on file   Relationships    Social connections     Talks on phone: Not on file     Gets together: Not on file     Attends Congregation service: Not on file     Active member of club or organization: Not on file     Attends meetings of clubs or organizations: Not on file     Relationship status: Not on file    Intimate partner violence     Fear of current or ex partner: Not on file     Emotionally abused: Not on file     Physically abused: Not on file     Forced sexual activity: Not on file   Other Topics Concern    Not on file   Social History Narrative    Not on file       History reviewed. No pertinent family history.       Health Maintenance Due   Topic Date Due    Shingles Vaccine (1 of 2) 1998    Low dose CT lung screening  2003    Annual Wellness Visit (AWV)  2019    Diabetic retinal exam  2020 BP (!) 146/90   Pulse 95   Temp 97.5 °F (36.4 °C)   Ht 6' 1\" (1.854 m)   Wt 212 lb (96.2 kg)   SpO2 96%   BMI 27.97 kg/m²   Body mass index is 27.97 kg/m². Physical Exam  Constitutional:       General: He is not in acute distress. Appearance: He is not diaphoretic. HENT:      Head: Normocephalic and atraumatic. Nose:      Comments: Masked, hoarse voice (unchanged)  Eyes:      General: No scleral icterus. Right eye: No discharge. Left eye: No discharge. Conjunctiva/sclera: Conjunctivae normal.      Pupils: Pupils are equal, round, and reactive to light. Neck:      Musculoskeletal: Normal range of motion and neck supple. Thyroid: No thyromegaly. Vascular: No JVD. Trachea: No tracheal deviation. Cardiovascular:      Rate and Rhythm: Normal rate and regular rhythm. Heart sounds: Normal heart sounds. No murmur. No friction rub. No gallop. Pulmonary:      Effort: Pulmonary effort is normal. No respiratory distress. Breath sounds: Normal breath sounds. No stridor. No wheezing. Chest:      Chest wall: No tenderness. Abdominal:      General: Bowel sounds are normal. There is no distension. Palpations: Abdomen is soft. There is no mass. Tenderness: There is no abdominal tenderness. There is no guarding or rebound. Musculoskeletal: Normal range of motion. General: No tenderness. Lymphadenopathy:      Cervical: No cervical adenopathy. Skin:     General: Skin is warm and dry. Coloration: Skin is not pale. Findings: No erythema or rash. Neurological:      Mental Status: He is alert and oriented to person, place, and time. Cranial Nerves: No cranial nerve deficit. Motor: No abnormal muscle tone. Coordination: Coordination normal.      Deep Tendon Reflexes: Reflexes are normal and symmetric.  Reflexes normal.   Psychiatric:         Mood and Affect: Mood normal.         Behavior: Behavior normal. 72 YRS =, IM, PF, PREFILL SYR, 0.5ML (FLUAD)    POCT glycosylated hemoglobin (Hb A1C)    HM DIABETES FOOT EXAM       I have reconciled the medications in chart with what patient reports to be taking, andreviewed action/ sideeffects and how to take any new medications. Patient/caregiver understands purpose and side effects. A complete  list of medications was provided in their after-visit summary. Return in about 4 months (around 2/6/2021) for f/u dm etc. extended with FBW. Time basedbilling: I spent over 25 minutes with this patient, and as is the nature of primary care and typical for my extended visits, over 50 percent of this visit was spent on counseling and coordination ofcare.

## 2021-01-12 DIAGNOSIS — E11.69 HYPERLIPIDEMIA ASSOCIATED WITH TYPE 2 DIABETES MELLITUS (HCC): ICD-10-CM

## 2021-01-12 DIAGNOSIS — E78.5 HYPERLIPIDEMIA ASSOCIATED WITH TYPE 2 DIABETES MELLITUS (HCC): ICD-10-CM

## 2021-01-13 NOTE — TELEPHONE ENCOUNTER
Requested Prescriptions     Pending Prescriptions Disp Refills    simvastatin (ZOCOR) 40 MG tablet [Pharmacy Med Name: SIMVASTATIN 40MG TABLETS] 90 tablet 3     Sig: TAKE 1 TABLET BY MOUTH EVERY NIGHT   Patient requesting a medication refill.     [facility]: [facility]  Next office visit: 2/8/2021  Last regular office visit: 10/6/2020

## 2021-01-14 RX ORDER — SIMVASTATIN 40 MG
TABLET ORAL
Qty: 90 TABLET | Refills: 3 | Status: SHIPPED | OUTPATIENT
Start: 2021-01-14 | End: 2022-01-11

## 2021-02-08 ENCOUNTER — OFFICE VISIT (OUTPATIENT)
Dept: INTERNAL MEDICINE CLINIC | Age: 71
End: 2021-02-08
Payer: MEDICARE

## 2021-02-08 VITALS
WEIGHT: 211.5 LBS | DIASTOLIC BLOOD PRESSURE: 84 MMHG | HEART RATE: 106 BPM | HEIGHT: 73 IN | BODY MASS INDEX: 28.03 KG/M2 | SYSTOLIC BLOOD PRESSURE: 128 MMHG | TEMPERATURE: 97.2 F | OXYGEN SATURATION: 97 %

## 2021-02-08 DIAGNOSIS — E11.69 HYPERLIPIDEMIA ASSOCIATED WITH TYPE 2 DIABETES MELLITUS (HCC): ICD-10-CM

## 2021-02-08 DIAGNOSIS — E11.9 TYPE 2 DIABETES MELLITUS WITHOUT COMPLICATION, WITHOUT LONG-TERM CURRENT USE OF INSULIN (HCC): Primary | ICD-10-CM

## 2021-02-08 DIAGNOSIS — E11.9 TYPE 2 DIABETES MELLITUS WITHOUT COMPLICATION, WITHOUT LONG-TERM CURRENT USE OF INSULIN (HCC): ICD-10-CM

## 2021-02-08 DIAGNOSIS — E55.9 VITAMIN D DEFICIENCY: ICD-10-CM

## 2021-02-08 DIAGNOSIS — Z12.11 COLON CANCER SCREENING: ICD-10-CM

## 2021-02-08 DIAGNOSIS — E78.5 HYPERLIPIDEMIA ASSOCIATED WITH TYPE 2 DIABETES MELLITUS (HCC): ICD-10-CM

## 2021-02-08 PROBLEM — Z63.4 WIDOWER: Status: ACTIVE | Noted: 2021-02-08

## 2021-02-08 LAB
A/G RATIO: 1.7 (ref 1.1–2.2)
ALBUMIN SERPL-MCNC: 4.6 G/DL (ref 3.4–5)
ALP BLD-CCNC: 78 U/L (ref 40–129)
ALT SERPL-CCNC: 48 U/L (ref 10–40)
ANION GAP SERPL CALCULATED.3IONS-SCNC: 15 MMOL/L (ref 3–16)
AST SERPL-CCNC: 23 U/L (ref 15–37)
BILIRUB SERPL-MCNC: 0.3 MG/DL (ref 0–1)
BUN BLDV-MCNC: 12 MG/DL (ref 7–20)
CALCIUM SERPL-MCNC: 9.8 MG/DL (ref 8.3–10.6)
CHLORIDE BLD-SCNC: 100 MMOL/L (ref 99–110)
CHOLESTEROL, TOTAL: 145 MG/DL (ref 0–199)
CO2: 23 MMOL/L (ref 21–32)
CREAT SERPL-MCNC: 0.9 MG/DL (ref 0.8–1.3)
GFR AFRICAN AMERICAN: >60
GFR NON-AFRICAN AMERICAN: >60
GLOBULIN: 2.7 G/DL
GLUCOSE BLD-MCNC: 161 MG/DL (ref 70–99)
HDLC SERPL-MCNC: 40 MG/DL (ref 40–60)
LDL CHOLESTEROL CALCULATED: 63 MG/DL
POTASSIUM SERPL-SCNC: 5.1 MMOL/L (ref 3.5–5.1)
SODIUM BLD-SCNC: 138 MMOL/L (ref 136–145)
TOTAL PROTEIN: 7.3 G/DL (ref 6.4–8.2)
TRIGL SERPL-MCNC: 211 MG/DL (ref 0–150)
VITAMIN D 25-HYDROXY: 46.5 NG/ML
VLDLC SERPL CALC-MCNC: 42 MG/DL

## 2021-02-08 PROCEDURE — 99214 OFFICE O/P EST MOD 30 MIN: CPT | Performed by: INTERNAL MEDICINE

## 2021-02-08 ASSESSMENT — PATIENT HEALTH QUESTIONNAIRE - PHQ9
SUM OF ALL RESPONSES TO PHQ QUESTIONS 1-9: 0
SUM OF ALL RESPONSES TO PHQ9 QUESTIONS 1 & 2: 0

## 2021-02-08 NOTE — PROGRESS NOTES
Chief Complaint   Patient presents with    Diabetes     4 month f/u       HPI: Here for diabetes followup and management of multiple chronic conditions as per the active problems list below, which I reviewed and updated with the patient today. States doing well with no new concerns except if noted below. He requests that we retest his A1c today as he may not have been quite as good about following his diet. He never scheduled his low-dose CT scan ordered over the summer; he is also overdue to see his eye doctors but is planning on scheduling that soon    I have reviewed the chart notes available from myself and other providers. I have reviewed and addressed all active problems and created or updated the problems list in detail, as needed. No problem-specific Assessment & Plan notes found for this encounter. I have extensively reviewed and reconciled the medication list, discontinued medications not taking or no longer appropriate, and updated the active meds list      /84 (Site: Right Upper Arm, Position: Sitting, Cuff Size: Medium Adult)   Pulse 106   Temp 97.2 °F (36.2 °C) (Infrared)   Ht 6' 1\" (1.854 m)   Wt 211 lb 8 oz (95.9 kg)   SpO2 97%   BMI 27.90 kg/m²   Body mass index is 27.9 kg/m². Physical Exam  Constitutional:       General: He is not in acute distress. Appearance: He is not diaphoretic. HENT:      Head: Normocephalic and atraumatic. Nose:      Comments: masked  Eyes:      General: No scleral icterus. Right eye: No discharge. Left eye: No discharge. Conjunctiva/sclera: Conjunctivae normal.      Pupils: Pupils are equal, round, and reactive to light. Neck:      Musculoskeletal: Normal range of motion and neck supple. Thyroid: No thyromegaly. Vascular: No JVD. Trachea: No tracheal deviation. Cardiovascular:      Rate and Rhythm: Normal rate and regular rhythm. Heart sounds: Normal heart sounds. No murmur.  No friction complication, without long-term current use of insulin ([facility]  - COMPREHENSIVE METABOLIC PANEL; Future  - HEMOGLOBIN A1C; Future    2. Hyperlipidemia associated with type 2 diabetes mellitus ([facility]  - COMPREHENSIVE METABOLIC PANEL; Future  - LIPID PANEL; Future    3. Vitamin D deficiency  - VITAMIN D 25 HYDROXY; Future    4. Colon cancer screening  - Cologuard (For External Results Only); Future            Problem List     Hyperlipidemia associated with type 2 diabetes mellitus (HCC)    Relevant Medications    metFORMIN (GLUCOPHAGE) 500 MG tablet    simvastatin (ZOCOR) 40 MG tablet    Other Relevant Orders    COMPREHENSIVE METABOLIC PANEL    LIPID PANEL    Type 2 diabetes mellitus without complication (HCC) - Primary    Relevant Medications    metFORMIN (GLUCOPHAGE) 500 MG tablet    Other Relevant Orders    COMPREHENSIVE METABOLIC PANEL    HEMOGLOBIN A1C    Vitamin D deficiency    Relevant Medications    Cholecalciferol (VITAMIN D3) 1000 units CAPS    Other Relevant Orders    VITAMIN D 25 HYDROXY        Orders Placed This Encounter   Procedures    Cologuard (For External Results Only)     This test is performed by an external laboratory and is used for result attachment only. It is required that this order requisition be faxed to: [facility] @ [phone]. See [email or web address] for further information.      Standing Status:   Future     Standing Expiration Date:   2/8/2022    COMPREHENSIVE METABOLIC PANEL     Standing Status:   Future     Standing Expiration Date:   2/8/2022    LIPID PANEL     Standing Status:   Future     Standing Expiration Date:   2/8/2022     Order Specific Question:   Is Patient Fasting?/# of Hours     Answer:   yes    HEMOGLOBIN A1C     Standing Status:   Future     Standing Expiration Date:   2/8/2022    VITAMIN D 25 HYDROXY     Standing Status:   Future     Standing Expiration Date:   2/8/2022       I have reconciled the medications in chart with what patient reports to be taking, andreviewed action/ sideeffects and how to take any new medications. Patient/caregiver understands purpose and side effects. A complete  list of medications was provided in their after-visit summary. Return in about 4 months (around 6/8/2021) for f/u dm etc. extended. Time basedbilling: I spent over 30 minutes with this patient, and as is the nature of primary care and typical for my extended visits, over 50 percent of this visit was spent on counseling and coordination ofcare.

## 2021-02-09 LAB
ESTIMATED AVERAGE GLUCOSE: 185.8 MG/DL
HBA1C MFR BLD: 8.1 %

## 2021-03-05 ENCOUNTER — IMMUNIZATION (OUTPATIENT)
Dept: PRIMARY CARE CLINIC | Age: 71
End: 2021-03-05
Payer: MEDICARE

## 2021-03-05 ENCOUNTER — HOSPITAL ENCOUNTER (OUTPATIENT)
Dept: CT IMAGING | Age: 71
Discharge: HOME | End: 2021-03-05
Payer: MEDICARE

## 2021-03-05 DIAGNOSIS — Z87.891 PERSONAL HISTORY OF TOBACCO USE: ICD-10-CM

## 2021-03-05 PROCEDURE — 71271 CT THORAX LUNG CANCER SCR C-: CPT

## 2021-03-05 PROCEDURE — 0001A COVID-19, PFIZER VACCINE 30MCG/0.3ML DOSE: CPT

## 2021-03-05 PROCEDURE — 91300 COVID-19, PFIZER VACCINE 30MCG/0.3ML DOSE: CPT

## 2021-03-26 ENCOUNTER — IMMUNIZATION (OUTPATIENT)
Dept: PRIMARY CARE CLINIC | Age: 71
End: 2021-03-26
Payer: MEDICARE

## 2021-03-26 PROCEDURE — 0002A COVID-19, PFIZER VACCINE 30MCG/0.3ML DOSE: CPT | Performed by: FAMILY MEDICINE

## 2021-03-26 PROCEDURE — 91300 COVID-19, PFIZER VACCINE 30MCG/0.3ML DOSE: CPT | Performed by: FAMILY MEDICINE

## 2021-05-17 DIAGNOSIS — I15.2 HYPERTENSION COMPLICATING DIABETES (HCC): ICD-10-CM

## 2021-05-17 DIAGNOSIS — E11.59 HYPERTENSION COMPLICATING DIABETES (HCC): ICD-10-CM

## 2021-05-18 RX ORDER — LOSARTAN POTASSIUM 25 MG/1
25 TABLET ORAL DAILY
Qty: 90 TABLET | Refills: 3 | Status: SHIPPED | OUTPATIENT
Start: 2021-05-18 | End: 2022-02-21 | Stop reason: SDUPTHER

## 2021-06-08 ENCOUNTER — OFFICE VISIT (OUTPATIENT)
Dept: INTERNAL MEDICINE CLINIC | Age: 71
End: 2021-06-08
Payer: MEDICARE

## 2021-06-08 VITALS
SYSTOLIC BLOOD PRESSURE: 120 MMHG | WEIGHT: 209 LBS | HEIGHT: 73 IN | DIASTOLIC BLOOD PRESSURE: 82 MMHG | HEART RATE: 94 BPM | BODY MASS INDEX: 27.7 KG/M2 | OXYGEN SATURATION: 96 %

## 2021-06-08 DIAGNOSIS — E55.9 VITAMIN D DEFICIENCY: ICD-10-CM

## 2021-06-08 DIAGNOSIS — E11.9 TYPE 2 DIABETES MELLITUS WITHOUT COMPLICATION, WITHOUT LONG-TERM CURRENT USE OF INSULIN (HCC): Primary | ICD-10-CM

## 2021-06-08 DIAGNOSIS — E11.69 HYPERLIPIDEMIA ASSOCIATED WITH TYPE 2 DIABETES MELLITUS (HCC): ICD-10-CM

## 2021-06-08 DIAGNOSIS — E78.5 HYPERLIPIDEMIA ASSOCIATED WITH TYPE 2 DIABETES MELLITUS (HCC): ICD-10-CM

## 2021-06-08 LAB — HBA1C MFR BLD: 7.7 %

## 2021-06-08 PROCEDURE — 83036 HEMOGLOBIN GLYCOSYLATED A1C: CPT | Performed by: INTERNAL MEDICINE

## 2021-06-08 PROCEDURE — 99214 OFFICE O/P EST MOD 30 MIN: CPT | Performed by: INTERNAL MEDICINE

## 2021-06-08 PROCEDURE — 3052F HG A1C>EQUAL 8.0%<EQUAL 9.0%: CPT | Performed by: INTERNAL MEDICINE

## 2021-06-08 RX ORDER — M-VIT,TX,IRON,MINS/CALC/FOLIC 27MG-0.4MG
1 TABLET ORAL DAILY
COMMUNITY

## 2021-06-08 RX ORDER — OMEGA-3/DHA/EPA/FISH OIL 60 MG-90MG
500 CAPSULE ORAL
COMMUNITY
End: 2022-01-04 | Stop reason: ALTCHOICE

## 2021-06-08 NOTE — PROGRESS NOTES
TRIG 211 (H) 02/08/2021    TRIG 139 01/21/2020    TRIG 147 01/24/2019    HDL 40 02/08/2021    HDL 37 (L) 01/21/2020    HDL 32 (L) 01/24/2019    LDLCALC 63 02/08/2021    LDLCALC 52 01/21/2020    LDLCALC 45 01/24/2019       Lab Results   Component Value Date    ALT 48 (H) 02/08/2021    ALT 26 01/21/2020    ALT 34 01/24/2019    AST 23 02/08/2021    AST 18 01/21/2020    AST 19 01/24/2019       Lab Results   Component Value Date    TSH 1.97 10/02/2015    TSH 1.49 05/22/2015    TSH 2.14 01/22/2015       Lab Results   Component Value Date    WBC 11.2 (H) 10/02/2015    WBC 12.3 (H) 05/22/2015    WBC 10.4 01/22/2015    HGB 16.3 10/02/2015    HGB 15.9 05/22/2015    HGB 15.1 01/22/2015    HCT 48.1 10/02/2015    HCT 48.7 05/22/2015    HCT 47.3 01/22/2015    MCV 88.6 10/02/2015    MCV 88.1 05/22/2015    MCV 89.0 01/22/2015     10/02/2015     05/22/2015     01/22/2015       No results found for: INR     Lab Results   Component Value Date    PSA 0.41 01/10/2017    PSA 0.4 10/02/2015    PSA 0.41 07/01/2013        No results found for: LABURIC          /82 (Site: Right Upper Arm, Position: Sitting, Cuff Size: Large Adult)   Pulse 94   Ht 6' 1\" (1.854 m)   Wt 209 lb (94.8 kg)   SpO2 96%   BMI 27.57 kg/m²   Body mass index is 27.57 kg/m². Physical Exam  Constitutional:       General: He is not in acute distress. Appearance: He is not diaphoretic. HENT:      Head: Normocephalic and atraumatic. Nose: Nose normal.      Mouth/Throat:      Pharynx: No oropharyngeal exudate. Eyes:      General: No scleral icterus. Right eye: No discharge. Left eye: No discharge. Conjunctiva/sclera: Conjunctivae normal.      Pupils: Pupils are equal, round, and reactive to light. Neck:      Thyroid: No thyromegaly. Vascular: No JVD. Trachea: No tracheal deviation. Cardiovascular:      Rate and Rhythm: Normal rate and regular rhythm. Heart sounds: Normal heart sounds.  No murmur heard. No friction rub. No gallop. Pulmonary:      Effort: Pulmonary effort is normal. No respiratory distress. Breath sounds: Normal breath sounds. No stridor. No wheezing. Chest:      Chest wall: No tenderness. Abdominal:      General: Bowel sounds are normal. There is no distension. Palpations: Abdomen is soft. There is no mass. Tenderness: There is no abdominal tenderness. There is no guarding or rebound. Musculoskeletal:         General: No tenderness. Normal range of motion. Cervical back: Normal range of motion and neck supple. Lymphadenopathy:      Cervical: No cervical adenopathy. Skin:     General: Skin is warm and dry. Coloration: Skin is not pale. Findings: No erythema or rash. Neurological:      Mental Status: He is alert and oriented to person, place, and time. Cranial Nerves: No cranial nerve deficit. Motor: No abnormal muscle tone. Coordination: Coordination normal.      Deep Tendon Reflexes: Reflexes are normal and symmetric. Reflexes normal.   Psychiatric:         Judgment: Judgment normal.         ASSESSMENT AND PLANS:      Except as noted below, all chronic problems have been reviewed and are stable to continue medications or other therapy as previously documented in the patient's chart, with changes per orders or documentation below:        Assessment and Plan: Patient received counseling and, if relevant,printed instructions for all symptoms listed in CC and HPI, as well as for all diagnoses brought onto today's visit note below. Typical counseling includes, but is not limited to, non pharmacologic measures to manage listed symptoms and conditions; appropriate use, risks and benefits for all prescribed medications; potential interactions between medications both prescribed and OTC; diet; exercise; healthy behaviors; and goalsetting to improve health.  Pt.or responsible party was involved in shared decision making and had opportunity to have all questions answered. We will have replacement Cologuard kit sent out to him. 1. Type 2 diabetes mellitus without complication, without long-term current use of insulin (HCC)  - POCT glycosylated hemoglobin (Hb A1C)    2. Hyperlipidemia associated with type 2 diabetes mellitus ([facility] [address]    3. Vitamin D deficiency            Problem List     Hyperlipidemia associated with type 2 diabetes mellitus (HCC)    Relevant Medications    metFORMIN (GLUCOPHAGE) 500 MG tablet    simvastatin (ZOCOR) 40 MG tablet    Type 2 diabetes mellitus without complication (HCC) - Primary    Relevant Medications    metFORMIN (GLUCOPHAGE) 500 MG tablet    Other Relevant Orders    POCT glycosylated hemoglobin (Hb A1C)    Vitamin D deficiency    Relevant Medications    Cholecalciferol (VITAMIN D3) 1000 units CAPS        Orders Placed This Encounter   Procedures    POCT glycosylated hemoglobin (Hb A1C)       I have reconciled the medications in chart with what patient reports to be taking, andreviewed action/ sideeffects and how to take any new medications. Patient/caregiver understands purpose and side effects. A complete  list of medications was provided in their after-visit summary. Return in about 4 months (around 10/8/2021) for f/u dm etc. extended.   `

## 2021-06-28 DIAGNOSIS — Z79.4 TYPE 2 DIABETES MELLITUS WITHOUT COMPLICATION, WITH LONG-TERM CURRENT USE OF INSULIN (HCC): ICD-10-CM

## 2021-06-28 DIAGNOSIS — E11.9 TYPE 2 DIABETES MELLITUS WITHOUT COMPLICATION, WITH LONG-TERM CURRENT USE OF INSULIN (HCC): ICD-10-CM

## 2021-06-28 NOTE — TELEPHONE ENCOUNTER
Requested Prescriptions     Pending Prescriptions Disp Refills    metFORMIN (GLUCOPHAGE) 500 MG tablet 90 tablet 3     Sig: Take 1 tablet by mouth daily (with breakfast)   Patient requesting a medication refill.   [facility]: [facility]  Next office visit: 10/8/2021  Last regular office visit: 6/8/2021

## 2021-10-21 ENCOUNTER — OFFICE VISIT (OUTPATIENT)
Dept: INTERNAL MEDICINE CLINIC | Age: 71
End: 2021-10-21
Payer: MEDICARE

## 2021-10-21 VITALS
WEIGHT: 209 LBS | DIASTOLIC BLOOD PRESSURE: 81 MMHG | HEIGHT: 73 IN | SYSTOLIC BLOOD PRESSURE: 122 MMHG | HEART RATE: 83 BPM | BODY MASS INDEX: 27.7 KG/M2 | OXYGEN SATURATION: 98 %

## 2021-10-21 DIAGNOSIS — Z00.00 ROUTINE GENERAL MEDICAL EXAMINATION AT A HEALTH CARE FACILITY: Primary | ICD-10-CM

## 2021-10-21 DIAGNOSIS — E11.9 TYPE 2 DIABETES MELLITUS WITHOUT COMPLICATION, WITHOUT LONG-TERM CURRENT USE OF INSULIN (HCC): Primary | ICD-10-CM

## 2021-10-21 DIAGNOSIS — Z91.81 AT HIGH RISK FOR FALLS: ICD-10-CM

## 2021-10-21 DIAGNOSIS — E11.69 HYPERLIPIDEMIA ASSOCIATED WITH TYPE 2 DIABETES MELLITUS (HCC): ICD-10-CM

## 2021-10-21 DIAGNOSIS — E78.5 HYPERLIPIDEMIA ASSOCIATED WITH TYPE 2 DIABETES MELLITUS (HCC): ICD-10-CM

## 2021-10-21 DIAGNOSIS — E55.9 VITAMIN D DEFICIENCY: ICD-10-CM

## 2021-10-21 PROCEDURE — 99214 OFFICE O/P EST MOD 30 MIN: CPT | Performed by: INTERNAL MEDICINE

## 2021-10-21 PROCEDURE — G0439 PPPS, SUBSEQ VISIT: HCPCS | Performed by: INTERNAL MEDICINE

## 2021-10-21 PROCEDURE — G0008 ADMIN INFLUENZA VIRUS VAC: HCPCS | Performed by: INTERNAL MEDICINE

## 2021-10-21 PROCEDURE — 90694 VACC AIIV4 NO PRSRV 0.5ML IM: CPT | Performed by: INTERNAL MEDICINE

## 2021-10-21 PROCEDURE — 3051F HG A1C>EQUAL 7.0%<8.0%: CPT | Performed by: INTERNAL MEDICINE

## 2021-10-21 ASSESSMENT — PATIENT HEALTH QUESTIONNAIRE - PHQ9
SUM OF ALL RESPONSES TO PHQ9 QUESTIONS 1 & 2: 0
SUM OF ALL RESPONSES TO PHQ QUESTIONS 1-9: 0
1. LITTLE INTEREST OR PLEASURE IN DOING THINGS: 0
SUM OF ALL RESPONSES TO PHQ QUESTIONS 1-9: 0
2. FEELING DOWN, DEPRESSED OR HOPELESS: 0
SUM OF ALL RESPONSES TO PHQ QUESTIONS 1-9: 0

## 2021-10-21 ASSESSMENT — LIFESTYLE VARIABLES: HOW OFTEN DO YOU HAVE A DRINK CONTAINING ALCOHOL: 0

## 2021-10-21 NOTE — PROGRESS NOTES
Vaccine Information Sheet, \"Influenza - Inactivated\"  given to Oleg Hugo, or parent/legal guardian of  Oleg Hugo and verbalized understanding. Patient responses:    Have you ever had a reaction to a flu vaccine? No  Do you have any current illness? No  Have you ever had Guillian Cicero Syndrome? No  Do you have a serious allergy to any of the follow: Neomycin, Polymyxin, Thimerosal, eggs or egg products? No    Flu vaccine given per order. Please see immunization tab. Risks and benefits explained. Current VIS given.       Immunizations Administered     Name Date Dose Route    Influenza, Quadv, adjuvanted, 65 yrs +, IM, PF (Fluad) 10/21/2021 0.5 mL Intramuscular    Site: Deltoid- Left    Lot: [id number]    NDC: 91492-634-47

## 2021-10-21 NOTE — PROGRESS NOTES
Medicare Annual Wellness Visit  Name: Richard Lujan Date: 10/21/2021   MRN: <[id number]> Sex: Male   Age: 66 y.o. Ethnicity: Non- / Non    : 1950 Race: White (non-)      Génesis Winn is here for Medicare AWV    Screenings for behavioral, psychosocial and functional/safety risks, and cognitive dysfunction are all negative except as indicated below. These results, as well as other patient data from the [address] form, are documented in Flowsheets linked to this Encounter. Allergies   Allergen Reactions    Sulfa Antibiotics        Prior to Visit Medications    Medication Sig Taking?  Authorizing Provider   metFORMIN (GLUCOPHAGE) 500 MG tablet Take 1 tablet by mouth daily (with breakfast)  Bharathi Sheikh MD   Multiple Vitamins-Minerals (THERAPEUTIC MULTIVITAMIN-MINERALS) tablet Take 1 tablet by mouth daily  Historical Provider, MD   Omega-3 Fatty Acids (FISH OIL) 500 MG CAPS Take 500 mg by mouth  Patient not taking: Reported on 10/21/2021  Historical Provider, MD   losartan (COZAAR) 25 MG tablet TAKE 1 TABLET BY MOUTH DAILY  Bharathi Sheikh MD   simvastatin (ZOCOR) 40 MG tablet TAKE 1 TABLET BY MOUTH EVERY NIGHT  Bharathi Sheikh MD   Cholecalciferol (VITAMIN D3) 1000 units CAPS Take 1,000 Units by mouth 2 times daily  Bharathi Sheikh MD   aspirin 81 MG tablet Take 81 mg by mouth daily  Historical Provider, MD   vitamin B-12 (CYANOCOBALAMIN) 100 MCG tablet Take 50 mcg by mouth daily  Historical Provider, MD   Ascorbic Acid (VITAMIN C) 250 MG tablet Take 250 mg by mouth daily  Historical Provider, MD       Past Medical History:   Diagnosis Date    COPD (chronic obstructive pulmonary disease) ([facility] .10    Diabetes mellitus type 2, uncontrolled ([facility] .    Fatigue 02.18.09    Hematuria 11.14.05    Hyperlipidemia 12.    Hyperlipidemia .    Hypertension .    IFG (impaired fasting glucose)     Impaired fasting glucose 09    Routine general medical examination at a health care facility 03.24.10    Screening for lipoid disorders 02.18.09    Shortness of breath 02.18.09    Urinary tract infection 11.14.05       No past surgical history on file. No family history on file. CareTeam (Including outside providers/suppliers regularly involved in providing care):   Patient Care Team:  Faith Erickson MD as PCP - General (Internal Medicine)  Faith Erickson MD as PCP - [facility]    Wt Readings from Last 3 Encounters:   10/21/21 209 lb (94.8 kg)   06/08/21 209 lb (94.8 kg)   02/08/21 211 lb 8 oz (95.9 kg)     There were no vitals filed for this visit. There is no height or weight on file to calculate BMI. Based upon direct observation of the patient, evaluation of cognition reveals recent and remote memory intact. Patient's complete Health Risk Assessment and screening values have been reviewed and are found in Flowsheets. The following problems were reviewed today and where indicated follow up appointments were made and/or referrals ordered. Positive Risk Factor Screenings with Interventions:     Fall Risk:  Timed Up and Go Test > 12 seconds? (Complete if either Fall Risk answers are Yes): no  2 or more falls in past year?: no  Fall with injury in past year?: (!) yes  Fall Risk Interventions:    · No concerns        General Health and ACP:  General  In general, how would you say your health is?: Very Good  In the past 7 days, have you experienced any of the following?  New or Increased Pain, New or Increased Fatigue, Loneliness, Social Isolation, Stress or Anger?: None of These  Do you get the social and emotional support that you need?: Yes  Do you have a Living Will?: Yes  Advance Directives     Power of [facility] Will ACP-Advance Directive ACP-Power of     Not on File Not on File Not on File Not on File      General Health Risk Interventions:  · No concerns        Personalized Preventive Plan Current Health Maintenance Status  Immunization History   Administered Date(s) Administered    COVID-19, Pfizer, PF, 30mcg/0.3mL 03/05/2021, 03/26/2021    Influenza 10/10/2012    Influenza Vaccine, unspecified formulation 11/06/2013, 10/20/2014, 10/05/2015    Influenza Virus Vaccine 10/05/2015    Influenza, High Dose (Fluzone 65 yrs and older) 11/06/2013, 10/20/2014, 09/13/2016, 10/02/2017, 09/18/2018    Influenza, Quadv, adjuvanted, 65 yrs +, IM, PF (Fluad) 10/06/2020, 10/21/2021    Influenza, Triv, inactivated, subunit, adjuvanted, IM (Fluad 65 yrs and older) 09/24/2019    Pneumococcal Conjugate 13-valent ([id number]) 01/10/2017    Pneumococcal Polysaccharide ([id number]) 08/23/2017, 01/22/2018    Tdap (Boostrix, Adacel) 03/24/2010, 07/20/2017        Health Maintenance   Topic Date Due    Shingles Vaccine (1 of 2) Never done    Colon Cancer Screen FIT/FOBT  01/17/2021    COVID-19 Vaccine (3 - Pfizer booster) 09/26/2021    Annual Wellness Visit (AWV)  10/07/2021    Lipid screen  02/08/2022    Potassium monitoring  02/08/2022    Creatinine monitoring  02/08/2022    Low dose CT lung screening  03/05/2022    A1C test (Diabetic or Prediabetic)  06/08/2022    Diabetic foot exam  10/21/2022    Diabetic retinal exam  10/07/2023    DTaP/Tdap/Td vaccine (3 - Td or Tdap) 07/20/2027    Flu vaccine  Completed    Pneumococcal 65+ years Vaccine  Completed    AAA screen  Completed    Hepatitis C screen  Completed    Hepatitis A vaccine  Aged Out    Hib vaccine  Aged Out    Meningococcal (ACWY) vaccine  Aged Out     Recommendations for Rosalind Due: see orders and patient instructions/AVS.  . Recommended screening schedule for the next 5-10 years is provided to the patient in written form: see Patient Instructions/AVS.    Deny Lopez LPN, 69/[id number]/2960, performed the documented evaluation under the direct supervision of the attending physician.     This encounter was performed under my,

## 2021-10-21 NOTE — PROGRESS NOTES
Chief Complaint   Patient presents with    Diabetes     4 month f/u       HPI: Here for diabetes followup and management of multiple chronic conditions as per the active problems list on chart, which I reviewed and updated with the patient today. States doing well with no new concerns except if noted below. Fasting glucoses are ranging in the 120s to 140s, depending on what he eats in the evening. I have reviewed the chart notes available from myself and other providers. I have reviewed and addressed all active problems and created or updated the problems list in detail, as needed. No problem-specific Assessment & Plan notes found for this encounter.       I have extensively reviewed and reconciled the medication list, discontinued medications not taking or no longer appropriate, and updated the active meds list      Lab Results   Component Value Date    LABA1C 7.7 06/08/2021    LABA1C 8.1 02/08/2021    LABA1C 6.8 10/06/2020    LABMICR 0.35 10/10/2012    LABMICR 1.02 07/11/2012    LABMICR 0.70 05/16/2012       Lab Results   Component Value Date     02/08/2021     01/21/2020     01/24/2019    K 5.1 02/08/2021    K 4.8 01/21/2020    K 4.9 01/24/2019     02/08/2021    CL 97 (L) 01/21/2020     01/24/2019    CO2 23 02/08/2021    CO2 27 01/21/2020    CO2 23 01/24/2019    BUN 12 02/08/2021    BUN 12 01/21/2020    BUN 13 01/24/2019    CREATININE 0.9 02/08/2021    CREATININE 0.8 01/21/2020    CREATININE 0.8 01/24/2019    GLUCOSE 161 (H) 02/08/2021    GLUCOSE 114 (H) 01/21/2020    GLUCOSE 105 (H) 01/24/2019    CALCIUM 9.8 02/08/2021    CALCIUM 9.3 01/21/2020    CALCIUM 9.2 01/24/2019       Lab Results   Component Value Date    CHOL 145 02/08/2021    CHOL 117 01/21/2020    CHOL 106 01/24/2019    TRIG 211 (H) 02/08/2021    TRIG 139 01/21/2020    TRIG 147 01/24/2019    HDL 40 02/08/2021    HDL 37 (L) 01/21/2020    HDL 32 (L) 01/24/2019    LDLCALC 63 02/08/2021    LDLCALC 52 01/21/2020    LDLCALC 45 01/24/2019       Lab Results   Component Value Date    ALT 48 (H) 02/08/2021    ALT 26 01/21/2020    ALT 34 01/24/2019    AST 23 02/08/2021    AST 18 01/21/2020    AST 19 01/24/2019       Lab Results   Component Value Date    TSH 1.97 10/02/2015    TSH 1.49 05/22/2015    TSH 2.14 01/22/2015       Lab Results   Component Value Date    WBC 11.2 (H) 10/02/2015    WBC 12.3 (H) 05/22/2015    WBC 10.4 01/22/2015    HGB 16.3 10/02/2015    HGB 15.9 05/22/2015    HGB 15.1 01/22/2015    HCT 48.1 10/02/2015    HCT 48.7 05/22/2015    HCT 47.3 01/22/2015    MCV 88.6 10/02/2015    MCV 88.1 05/22/2015    MCV 89.0 01/22/2015     10/02/2015     05/22/2015     01/22/2015       No results found for: INR     Lab Results   Component Value Date    PSA 0.41 01/10/2017    PSA 0.4 10/02/2015    PSA 0.41 07/01/2013        No results found for: LABURIC          /81 (Site: Right Upper Arm)   Pulse 83   Ht 6' 1\" (1.854 m)   Wt 209 lb (94.8 kg)   SpO2 98%   BMI 27.57 kg/m²   Body mass index is 27.57 kg/m². Physical Exam  Constitutional:       General: He is not in acute distress. Appearance: He is not diaphoretic. HENT:      Head: Normocephalic and atraumatic. Nose: Nose normal.      Mouth/Throat:      Pharynx: No oropharyngeal exudate. Eyes:      General: No scleral icterus. Right eye: No discharge. Left eye: No discharge. Conjunctiva/sclera: Conjunctivae normal.      Pupils: Pupils are equal, round, and reactive to light. Neck:      Thyroid: No thyromegaly. Vascular: No JVD. Trachea: No tracheal deviation. Cardiovascular:      Rate and Rhythm: Normal rate and regular rhythm. Heart sounds: Normal heart sounds. No murmur heard. No friction rub. No gallop. Pulmonary:      Effort: Pulmonary effort is normal. No respiratory distress. Breath sounds: Normal breath sounds. No stridor. No wheezing.    Chest:      Chest wall: No tenderness. Abdominal:      General: Bowel sounds are normal. There is no distension. Palpations: Abdomen is soft. There is no mass. Tenderness: There is no abdominal tenderness. There is no guarding or rebound. Musculoskeletal:         General: No tenderness. Normal range of motion. Cervical back: Normal range of motion and neck supple. Lymphadenopathy:      Cervical: No cervical adenopathy. Skin:     General: Skin is warm and dry. Coloration: Skin is not pale. Findings: No erythema or rash. Neurological:      Mental Status: He is alert and oriented to person, place, and time. Cranial Nerves: No cranial nerve deficit. Motor: No abnormal muscle tone. Coordination: Coordination normal.      Deep Tendon Reflexes: Reflexes are normal and symmetric. Reflexes normal.   Psychiatric:         Judgment: Judgment normal.         ASSESSMENT AND PLANS:      Except as noted below, all chronic problems have been reviewed and are stable to continue medications or other therapy as previously documented in the patient's chart, with changes per orders or documentation below:        Assessment and Plan: Patient received counseling and, if relevant,printed instructions for all symptoms listed in CC and HPI, as well as for all diagnoses brought onto today's visit note below. Typical counseling includes, but is not limited to, non pharmacologic measures to manage listed symptoms and conditions; appropriate use, risks and benefits for all prescribed medications; potential interactions between medications both prescribed and OTC; diet; exercise; healthy behaviors; and goalsetting to improve health. Pt.or responsible party was involved in shared decision making and had opportunity to have all questions answered. Discussed cologuard--pt did not receive from February orders, will resend. Discussed covid booster, pt plans to get but wants flu vax today.       1. Type 2 diabetes mellitus without complication, without long-term current use of insulin (HCC)  -  DIABETES FOOT EXAM    2. Hyperlipidemia associated with type 2 diabetes mellitus ([facility] [address]    3. Vitamin D deficiency            Problem List     Hyperlipidemia associated with type 2 diabetes mellitus (HCC)    Relevant Medications    simvastatin (ZOCOR) 40 MG tablet    metFORMIN (GLUCOPHAGE) 500 MG tablet    Type 2 diabetes mellitus without complication (HCC) - Primary    Relevant Medications    metFORMIN (GLUCOPHAGE) 500 MG tablet    Other Relevant Orders     DIABETES FOOT EXAM (Completed)    Vitamin D deficiency    Relevant Medications    Cholecalciferol (VITAMIN D3) 1000 units CAPS        Orders Placed This Encounter   Procedures    INFLUENZA, QUADV, ADJUVANTED, 72 YRS =, IM, PF, PREFILL SYR, 0.5ML (FLUAD)     DIABETES FOOT EXAM       I have reconciled the medications in chart with what patient reports to be taking, andreviewed action/ sideeffects and how to take any new medications. Patient/caregiver understands purpose and side effects. A complete  list of medications was provided in their after-visit summary. Return in about 4 months (around 2/21/2022) for f/u dm etc. extended with FBW. Time basedbilling: I spent over *30  minutes with this patient, and as is the nature of primary care and typical for my extended visits, over 50 percent of this visit was spent on counseling and coordination ofcare. On the basis of positive falls risk screening, assessment and plan is as follows: home safety tips provided.

## 2021-10-21 NOTE — PATIENT INSTRUCTIONS
Personalized Preventive Plan for Génesis Winn - 10/21/2021  Medicare offers a range of preventive health benefits. Some of the tests and screenings are paid in full while other may be subject to a deductible, co-insurance, and/or copay. Some of these benefits include a comprehensive review of your medical history including lifestyle, illnesses that may run in your family, and various assessments and screenings as appropriate. After reviewing your medical record and screening and assessments performed today your provider may have ordered immunizations, labs, imaging, and/or referrals for you. A list of these orders (if applicable) as well as your Preventive Care list are included within your After Visit Summary for your review. Other Preventive Recommendations:    · A preventive eye exam performed by an eye specialist is recommended every 1-2 years to screen for glaucoma; cataracts, macular degeneration, and other eye disorders. · A preventive dental visit is recommended every 6 months. · Try to get at least 150 minutes of exercise per week or 10,000 steps per day on a pedometer . · Order or download the FREE \"Exercise & Physical Activity: Your Everyday Guide\" from [employer]. Call 9-634.611.3219 or search [employer] online. · You need 6708-3098 mg of calcium and 8297-1205 IU of vitamin D per day. It is possible to meet your calcium requirement with diet alone, but a vitamin D supplement is usually necessary to meet this goal.  · When exposed to the sun, use a sunscreen that protects against both UVA and UVB radiation with an SPF of 30 or greater. Reapply every 2 to 3 hours or after sweating, drying off with a towel, or swimming. · Always wear a seat belt when traveling in a car. Always wear a helmet when riding a bicycle or motorcycle.

## 2021-11-05 ENCOUNTER — TELEPHONE (OUTPATIENT)
Dept: INTERNAL MEDICINE CLINIC | Age: 71
End: 2021-11-05

## 2021-11-05 DIAGNOSIS — R19.5 POSITIVE COLORECTAL CANCER SCREENING USING COLOGUARD TEST: Primary | ICD-10-CM

## 2021-11-05 NOTE — TELEPHONE ENCOUNTER
Received positive cologuard result. Please notify patient that I have referred him on for diagnostic colonoscopy.

## 2022-01-10 DIAGNOSIS — E11.69 HYPERLIPIDEMIA ASSOCIATED WITH TYPE 2 DIABETES MELLITUS (HCC): ICD-10-CM

## 2022-01-10 DIAGNOSIS — E78.5 HYPERLIPIDEMIA ASSOCIATED WITH TYPE 2 DIABETES MELLITUS (HCC): ICD-10-CM

## 2022-01-11 RX ORDER — SIMVASTATIN 40 MG
TABLET ORAL
Qty: 90 TABLET | Refills: 3 | Status: SHIPPED | OUTPATIENT
Start: 2022-01-11 | End: 2022-10-06

## 2022-01-11 NOTE — TELEPHONE ENCOUNTER
Requested Prescriptions     Pending Prescriptions Disp Refills    simvastatin (ZOCOR) 40 MG tablet [Pharmacy Med Name: SIMVASTATIN 40MG TABLETS] 90 tablet 3     Sig: TAKE 1 TABLET BY MOUTH EVERY NIGHT     Patient requesting a medication refill.       Next office visit: 2/21/2022    Last regular office visit: 10/21/2021

## 2022-02-08 ENCOUNTER — TELEPHONE (OUTPATIENT)
Dept: CASE MANAGEMENT | Age: 72
End: 2022-02-08

## 2022-02-08 NOTE — TELEPHONE ENCOUNTER
[facility] Screening Reminder Letter mailed to patient. Most recent smoking history reviewed.

## 2022-02-21 ENCOUNTER — OFFICE VISIT (OUTPATIENT)
Dept: INTERNAL MEDICINE CLINIC | Age: 72
End: 2022-02-21
Payer: MEDICARE

## 2022-02-21 VITALS
BODY MASS INDEX: 27.39 KG/M2 | WEIGHT: 207.6 LBS | DIASTOLIC BLOOD PRESSURE: 82 MMHG | OXYGEN SATURATION: 96 % | HEART RATE: 103 BPM | RESPIRATION RATE: 12 BRPM | SYSTOLIC BLOOD PRESSURE: 126 MMHG

## 2022-02-21 DIAGNOSIS — Z87.891 PERSONAL HISTORY OF TOBACCO USE: ICD-10-CM

## 2022-02-21 DIAGNOSIS — E78.5 HYPERLIPIDEMIA ASSOCIATED WITH TYPE 2 DIABETES MELLITUS (HCC): ICD-10-CM

## 2022-02-21 DIAGNOSIS — Z87.891 FORMER SMOKER: ICD-10-CM

## 2022-02-21 DIAGNOSIS — I15.2 HYPERTENSION COMPLICATING DIABETES (HCC): ICD-10-CM

## 2022-02-21 DIAGNOSIS — E11.59 HYPERTENSION COMPLICATING DIABETES (HCC): ICD-10-CM

## 2022-02-21 DIAGNOSIS — E11.69 HYPERLIPIDEMIA ASSOCIATED WITH TYPE 2 DIABETES MELLITUS (HCC): ICD-10-CM

## 2022-02-21 DIAGNOSIS — E11.9 TYPE 2 DIABETES MELLITUS WITHOUT COMPLICATION, WITHOUT LONG-TERM CURRENT USE OF INSULIN (HCC): ICD-10-CM

## 2022-02-21 DIAGNOSIS — E11.9 TYPE 2 DIABETES MELLITUS WITHOUT COMPLICATION, WITHOUT LONG-TERM CURRENT USE OF INSULIN (HCC): Primary | ICD-10-CM

## 2022-02-21 LAB
A/G RATIO: 1.8 (ref 1.1–2.2)
ALBUMIN SERPL-MCNC: 4.6 G/DL (ref 3.4–5)
ALP BLD-CCNC: 76 U/L (ref 40–129)
ALT SERPL-CCNC: 32 U/L (ref 10–40)
ANION GAP SERPL CALCULATED.3IONS-SCNC: 15 MMOL/L (ref 3–16)
AST SERPL-CCNC: 21 U/L (ref 15–37)
BILIRUB SERPL-MCNC: 0.4 MG/DL (ref 0–1)
BUN BLDV-MCNC: 10 MG/DL (ref 7–20)
CALCIUM SERPL-MCNC: 9.4 MG/DL (ref 8.3–10.6)
CHLORIDE BLD-SCNC: 97 MMOL/L (ref 99–110)
CHOLESTEROL, TOTAL: 124 MG/DL (ref 0–199)
CO2: 24 MMOL/L (ref 21–32)
CREAT SERPL-MCNC: 0.9 MG/DL (ref 0.8–1.3)
GFR AFRICAN AMERICAN: >60
GFR NON-AFRICAN AMERICAN: >60
GLUCOSE BLD-MCNC: 149 MG/DL (ref 70–99)
HDLC SERPL-MCNC: 35 MG/DL (ref 40–60)
LDL CHOLESTEROL CALCULATED: 56 MG/DL
POTASSIUM SERPL-SCNC: 4.8 MMOL/L (ref 3.5–5.1)
SODIUM BLD-SCNC: 136 MMOL/L (ref 136–145)
TOTAL PROTEIN: 7.1 G/DL (ref 6.4–8.2)
TRIGL SERPL-MCNC: 166 MG/DL (ref 0–150)
VLDLC SERPL CALC-MCNC: 33 MG/DL

## 2022-02-21 PROCEDURE — G0296 VISIT TO DETERM LDCT ELIG: HCPCS | Performed by: INTERNAL MEDICINE

## 2022-02-21 PROCEDURE — 3052F HG A1C>EQUAL 8.0%<EQUAL 9.0%: CPT | Performed by: INTERNAL MEDICINE

## 2022-02-21 PROCEDURE — 99214 OFFICE O/P EST MOD 30 MIN: CPT | Performed by: INTERNAL MEDICINE

## 2022-02-21 RX ORDER — LOSARTAN POTASSIUM 25 MG/1
25 TABLET ORAL DAILY
Qty: 90 TABLET | Refills: 3 | Status: SHIPPED | OUTPATIENT
Start: 2022-02-21

## 2022-02-21 NOTE — PROGRESS NOTES
02/08/2021    ALT 26 01/21/2020    AST 21 02/21/2022    AST 23 02/08/2021    AST 18 01/21/2020       Lab Results   Component Value Date    TSH 1.97 10/02/2015    TSH 1.49 05/22/2015    TSH 2.14 01/22/2015       Lab Results   Component Value Date    WBC 11.2 (H) 10/02/2015    WBC 12.3 (H) 05/22/2015    WBC 10.4 01/22/2015    HGB 16.3 10/02/2015    HGB 15.9 05/22/2015    HGB 15.1 01/22/2015    HCT 48.1 10/02/2015    HCT 48.7 05/22/2015    HCT 47.3 01/22/2015    MCV 88.6 10/02/2015    MCV 88.1 05/22/2015    MCV 89.0 01/22/2015     10/02/2015     05/22/2015     01/22/2015       No results found for: INR     Lab Results   Component Value Date    PSA 0.41 01/10/2017    PSA 0.4 10/02/2015    PSA 0.41 07/01/2013        No results found for: LABURIC          /82   Pulse 103   Resp 12   Wt 207 lb 9.6 oz (94.2 kg)   SpO2 96%   BMI 27.39 kg/m²   Body mass index is 27.39 kg/m². Physical Exam  Constitutional:       General: He is not in acute distress. Appearance: He is not diaphoretic. HENT:      Head: Normocephalic and atraumatic. Nose:      Comments: masked  Eyes:      General: No scleral icterus. Right eye: No discharge. Left eye: No discharge. Conjunctiva/sclera: Conjunctivae normal.      Pupils: Pupils are equal, round, and reactive to light. Neck:      Thyroid: No thyromegaly. Vascular: No JVD. Trachea: No tracheal deviation. Cardiovascular:      Rate and Rhythm: Normal rate and regular rhythm. Heart sounds: Normal heart sounds. No murmur heard. No friction rub. No gallop. Pulmonary:      Effort: Pulmonary effort is normal. No respiratory distress. Breath sounds: Normal breath sounds. No stridor. No wheezing. Chest:      Chest wall: No tenderness. Abdominal:      General: Bowel sounds are normal. There is no distension. Palpations: Abdomen is soft. There is no mass. Tenderness: There is no abdominal tenderness.  There is no guarding or rebound. Musculoskeletal:         General: No tenderness. Normal range of motion. Cervical back: Normal range of motion and neck supple. Lymphadenopathy:      Cervical: No cervical adenopathy. Skin:     General: Skin is warm and dry. Coloration: Skin is not pale. Findings: No erythema or rash. Neurological:      Mental Status: He is alert and oriented to person, place, and time. Cranial Nerves: No cranial nerve deficit. Motor: No abnormal muscle tone. Coordination: Coordination normal.      Deep Tendon Reflexes: Reflexes are normal and symmetric. Reflexes normal.   Psychiatric:         Mood and Affect: Mood normal.         Behavior: Behavior normal.         Thought Content: Thought content normal.         Judgment: Judgment normal.         ASSESSMENT AND PLANS:      Except as noted below, all chronic problems have been reviewed and are stable to continue medications or other therapy as previously documented in the patient's chart, with changes per orders or documentation below:        Assessment and Plan: Patient received counseling and, if relevant,printed instructions for all symptoms listed in CC and HPI, as well as for all diagnoses brought onto today's visit note below. Typical counseling includes, but is not limited to, non pharmacologic measures to manage listed symptoms and conditions; appropriate use, risks and benefits for all prescribed medications; potential interactions between medications both prescribed and OTC; diet; exercise; healthy behaviors; and goalsetting to improve health. Pt.or responsible party was involved in shared decision making and had opportunity to have all questions answered. 1. Type 2 diabetes mellitus without complication, without long-term current use of insulin ([facility]  - Comprehensive Metabolic Panel; Future  - Hemoglobin A1C; Future  - Lipid Panel; Future    2.  Hyperlipidemia associated with type 2 diabetes mellitus ([facility]  - Lipid Panel; Future    3. Hypertension complicating diabetes ([facility]  - losartan (COZAAR) 25 MG tablet; Take 1 tablet by mouth daily  Dispense: 90 tablet; Refill: 3    4. Former smoker    5. Personal history of tobacco use  - [place] VISIT TO DISCUSS LUNG CA SCREEN W LDCT  - CT Lung Screen (Annual); Future            Problem List     Hypertension complicating diabetes ([facility]    Relevant Medications    metFORMIN (GLUCOPHAGE) 500 MG tablet    losartan (COZAAR) 25 MG tablet    Hyperlipidemia associated with type 2 diabetes mellitus (HCC)    Relevant Medications    metFORMIN (GLUCOPHAGE) 500 MG tablet    simvastatin (ZOCOR) 40 MG tablet    Other Relevant Orders    Lipid Panel (Completed)    Type 2 diabetes mellitus without complication (HCC) - Primary    Relevant Medications    metFORMIN (GLUCOPHAGE) 500 MG tablet    Other Relevant Orders    Comprehensive Metabolic Panel (Completed)    Hemoglobin A1C (Completed)    Lipid Panel (Completed)    Former smoker        Orders Placed This Encounter   Procedures    CT Lung Screen (Annual)     Age: Patient is 66 y.o. Smoking History: Social History    Tobacco Use      Smoking status: Former Smoker        Packs/day: 15.00        Years: 25.00        Pack years: 375        Types: Cigarettes        Quit date: 2013        Years since quittin.0      Smokeless tobacco: Never Used      Tobacco comment: NAF 2013    Alcohol use: Yes      Comment: Occasional    Drug use: No   Pack years: 375    Date of last lung cancer screening: 3/5/2021     Standing Status:   Future     Standing Expiration Date:   2023     Order Specific Question:   Is there documentation of shared decision making? Answer:   Yes     Order Specific Question:   Is this a low dose CT or a routine CT? Answer:   Low Dose CT [1]     Order Specific Question:   Is this the first (baseline) CT or an annual exam?     Answer:    Annual [2]     Order Specific Question:   Does the patient show any signs or symptoms of lung cancer? Answer:   No     Order Specific Question:   Smoking Status? Answer: Former Smoker [4]     Order Specific Question:   Date quit smoking? (must be within 15 years)     Answer:   2/6/2013     Order Specific Question:   Smoking packs per day? Answer:   13     Order Specific Question:   Years smoking? Answer:   25    Comprehensive Metabolic Panel     Standing Status:   Future     Number of Occurrences:   1     Standing Expiration Date:   2/21/2023    Hemoglobin A1C     Standing Status:   Future     Number of Occurrences:   1     Standing Expiration Date:   2/21/2023    Lipid Panel     Standing Status:   Future     Number of Occurrences:   1     Standing Expiration Date:   2/21/2023     Order Specific Question:   Is Patient Fasting?/# of Hours     Answer:   unk    [place] VISIT TO DISCUSS LUNG CA SCREEN W LDCT       I have reconciled the medications in chart with what patient reports to be taking, andreviewed action/ sideeffects and how to take any new medications. Patient/caregiver understands purpose and side effects. A complete  list of medications was provided in their after-visit summary. Return in about 4 months (around 6/21/2022) for f/u dm etc. extended. Time basedbilling: I spent over 30  minutes with this patient, and as is the nature of primary care and typical for my extended visits, over 50 percent of this visit was spent on counseling and coordination ofcare. Low Dose CT (LDCT) Lung Screening criteria met:     Age 55-77(Medicare) or 50-80 ([facility])   Pack year smoking >30 (Medicare) or >20 ([facility])   Still smoking or less than 15 year since quit   No sign or symptoms of lung cancer   > 11 months since last LDCT     Risks and benefits of lung cancer screening with LDCT scans discussed:    Significance of positive screen - False-positive LDCT results often occur. 95% of all positive results do not lead to a diagnosis of cancer.  Usually further imaging can resolve most false-positive results; however, some patients may require invasive procedures. Over diagnosis risk - 10% to 12% of screen-detected lung cancer cases are over diagnosed--that is, the cancer would not have been detected in the patient's lifetime without the screening. Need for follow up screens annually to continue lung cancer screening effectiveness     Risks associated with radiation from annual LDCT- Radiation exposure is about the same as for a mammogram, which is about 1/3 of the annual background radiation exposure from everyday life. Starting screening at age 54 is not likely to increase cancer risk from radiation exposure. Patients with comorbidities resulting in life expectancy of < 10 years, or that would preclude treatment of an abnormality identified on CT, should not be screened due to lack of benefit.     To obtain maximal benefit from this screening, smoking cessation and long-term abstinence from smoking is critical

## 2022-02-22 LAB
ESTIMATED AVERAGE GLUCOSE: 194.4 MG/DL
HBA1C MFR BLD: 8.4 %

## 2022-02-23 RX ORDER — LANCETS
1 EACH MISCELLANEOUS DAILY
Qty: 100 EACH | Refills: 3 | OUTPATIENT
Start: 2022-02-23

## 2022-02-23 RX ORDER — GLUCOSAMINE HCL/CHONDROITIN SU 500-400 MG
CAPSULE ORAL
Qty: 100 STRIP | Refills: 3 | OUTPATIENT
Start: 2022-02-23

## 2022-03-07 ENCOUNTER — HOSPITAL ENCOUNTER (OUTPATIENT)
Dept: CT IMAGING | Age: 72
Discharge: HOME | End: 2022-03-07
Payer: MEDICARE

## 2022-03-07 DIAGNOSIS — Z87.891 PERSONAL HISTORY OF TOBACCO USE: ICD-10-CM

## 2022-03-07 PROCEDURE — 71271 CT THORAX LUNG CANCER SCR C-: CPT

## 2022-03-21 ENCOUNTER — TELEPHONE (OUTPATIENT)
Dept: CASE MANAGEMENT | Age: 72
End: 2022-03-21

## 2022-03-21 NOTE — TELEPHONE ENCOUNTER
CT Lung Cancer Screening completed on 3/7/2022,  Dr Elinor Sears reviewed radiology results with recommendations &  notified patient of results with recommendations. Smoking history reviewed.

## 2022-06-21 ENCOUNTER — OFFICE VISIT (OUTPATIENT)
Dept: INTERNAL MEDICINE CLINIC | Age: 72
End: 2022-06-21
Payer: MEDICARE

## 2022-06-21 VITALS
OXYGEN SATURATION: 96 % | DIASTOLIC BLOOD PRESSURE: 83 MMHG | WEIGHT: 201 LBS | SYSTOLIC BLOOD PRESSURE: 123 MMHG | HEIGHT: 73 IN | HEART RATE: 90 BPM | BODY MASS INDEX: 26.64 KG/M2

## 2022-06-21 DIAGNOSIS — L98.9 SKIN LESION OF RIGHT ARM: ICD-10-CM

## 2022-06-21 DIAGNOSIS — E11.9 TYPE 2 DIABETES MELLITUS WITHOUT COMPLICATION, WITHOUT LONG-TERM CURRENT USE OF INSULIN (HCC): Primary | ICD-10-CM

## 2022-06-21 DIAGNOSIS — E11.59 HYPERTENSION COMPLICATING DIABETES (HCC): ICD-10-CM

## 2022-06-21 DIAGNOSIS — E11.69 HYPERLIPIDEMIA ASSOCIATED WITH TYPE 2 DIABETES MELLITUS (HCC): ICD-10-CM

## 2022-06-21 DIAGNOSIS — E78.5 HYPERLIPIDEMIA ASSOCIATED WITH TYPE 2 DIABETES MELLITUS (HCC): ICD-10-CM

## 2022-06-21 DIAGNOSIS — I15.2 HYPERTENSION COMPLICATING DIABETES (HCC): ICD-10-CM

## 2022-06-21 LAB — HBA1C MFR BLD: 6.8 %

## 2022-06-21 PROCEDURE — 1123F ACP DISCUSS/DSCN MKR DOCD: CPT | Performed by: INTERNAL MEDICINE

## 2022-06-21 PROCEDURE — 3052F HG A1C>EQUAL 8.0%<EQUAL 9.0%: CPT | Performed by: INTERNAL MEDICINE

## 2022-06-21 PROCEDURE — 83036 HEMOGLOBIN GLYCOSYLATED A1C: CPT | Performed by: INTERNAL MEDICINE

## 2022-06-21 PROCEDURE — 99214 OFFICE O/P EST MOD 30 MIN: CPT | Performed by: INTERNAL MEDICINE

## 2022-06-21 ASSESSMENT — PATIENT HEALTH QUESTIONNAIRE - PHQ9
SUM OF ALL RESPONSES TO PHQ QUESTIONS 1-9: 0
2. FEELING DOWN, DEPRESSED OR HOPELESS: 0
SUM OF ALL RESPONSES TO PHQ QUESTIONS 1-9: 0
SUM OF ALL RESPONSES TO PHQ QUESTIONS 1-9: 0
SUM OF ALL RESPONSES TO PHQ9 QUESTIONS 1 & 2: 0
1. LITTLE INTEREST OR PLEASURE IN DOING THINGS: 0
SUM OF ALL RESPONSES TO PHQ QUESTIONS 1-9: 0

## 2022-06-21 NOTE — PROGRESS NOTES
Chief Complaint   Patient presents with    Diabetes     4 month f/u       HPI: Here for diabetes followup and management of multiple chronic conditions as per the active problems list on chart, which I reviewed and updated with the patient today. States doing well with no new concerns except if noted below. I have reviewed the chart notes available from myself and other providers. I have reviewed and addressed all active problems and created or updated the problems list in detail, as needed. No problem-specific Assessment & Plan notes found for this encounter.       I have extensively reviewed and reconciled the medication list, discontinued medications not taking or no longer appropriate, and updated the active meds list      Lab Results   Component Value Date    LABA1C 6.8 06/21/2022    LABA1C 8.4 02/21/2022    LABA1C 7.7 06/08/2021    LABMICR 0.35 10/10/2012    LABMICR 1.02 07/11/2012    LABMICR 0.70 05/16/2012       Lab Results   Component Value Date     02/21/2022     02/08/2021     01/21/2020    K 4.8 02/21/2022    K 5.1 02/08/2021    K 4.8 01/21/2020    CL 97 (L) 02/21/2022     02/08/2021    CL 97 (L) 01/21/2020    CO2 24 02/21/2022    CO2 23 02/08/2021    CO2 27 01/21/2020    BUN 10 02/21/2022    BUN 12 02/08/2021    BUN 12 01/21/2020    CREATININE 0.9 02/21/2022    CREATININE 0.9 02/08/2021    CREATININE 0.8 01/21/2020    GLUCOSE 149 (H) 02/21/2022    GLUCOSE 161 (H) 02/08/2021    GLUCOSE 114 (H) 01/21/2020    CALCIUM 9.4 02/21/2022    CALCIUM 9.8 02/08/2021    CALCIUM 9.3 01/21/2020       Lab Results   Component Value Date    CHOL 124 02/21/2022    CHOL 145 02/08/2021    CHOL 117 01/21/2020    TRIG 166 (H) 02/21/2022    TRIG 211 (H) 02/08/2021    TRIG 139 01/21/2020    HDL 35 (L) 02/21/2022    HDL 40 02/08/2021    HDL 37 (L) 01/21/2020    LDLCALC 56 02/21/2022    LDLCALC 63 02/08/2021    LDLCALC 52 01/21/2020       Lab Results   Component Value Date    ALT 32 02/21/2022 ALT 48 (H) 02/08/2021    ALT 26 01/21/2020    AST 21 02/21/2022    AST 23 02/08/2021    AST 18 01/21/2020       Lab Results   Component Value Date    TSH 1.97 10/02/2015    TSH 1.49 05/22/2015    TSH 2.14 01/22/2015       Lab Results   Component Value Date    WBC 11.2 (H) 10/02/2015    WBC 12.3 (H) 05/22/2015    WBC 10.4 01/22/2015    HGB 16.3 10/02/2015    HGB 15.9 05/22/2015    HGB 15.1 01/22/2015    HCT 48.1 10/02/2015    HCT 48.7 05/22/2015    HCT 47.3 01/22/2015    MCV 88.6 10/02/2015    MCV 88.1 05/22/2015    MCV 89.0 01/22/2015     10/02/2015     05/22/2015     01/22/2015       No results found for: INR     Lab Results   Component Value Date    PSA 0.41 01/10/2017    PSA 0.4 10/02/2015    PSA 0.41 07/01/2013        No results found for: LABURIC          /83   Pulse 90   Ht 6' 1\" (1.854 m)   Wt 201 lb (91.2 kg)   SpO2 96%   BMI 26.52 kg/m²   Body mass index is 26.52 kg/m². Physical Exam  Constitutional:       General: He is not in acute distress. Appearance: He is not diaphoretic. HENT:      Head: Normocephalic and atraumatic. Nose:      Comments: masked  Eyes:      General: No scleral icterus. Right eye: No discharge. Left eye: No discharge. Conjunctiva/sclera: Conjunctivae normal.      Pupils: Pupils are equal, round, and reactive to light. Neck:      Thyroid: No thyromegaly. Vascular: No JVD. Trachea: No tracheal deviation. Cardiovascular:      Rate and Rhythm: Normal rate and regular rhythm. Heart sounds: Normal heart sounds. No murmur heard. No friction rub. No gallop. Pulmonary:      Effort: Pulmonary effort is normal. No respiratory distress. Breath sounds: Normal breath sounds. No stridor. No wheezing. Chest:      Chest wall: No tenderness. Abdominal:      General: Bowel sounds are normal. There is no distension. Palpations: Abdomen is soft. There is no mass. Tenderness:  There is no abdominal tenderness. There is no guarding or rebound. Musculoskeletal:         General: No tenderness. Normal range of motion. Cervical back: Normal range of motion and neck supple. Lymphadenopathy:      Cervical: No cervical adenopathy. Skin:     General: Skin is warm and dry. Coloration: Skin is not pale. Findings: No erythema or rash. Comments: 5 mm hyperkeratotic lesion right forearm. Neurological:      Mental Status: He is alert and oriented to person, place, and time. Cranial Nerves: No cranial nerve deficit. Motor: No abnormal muscle tone. Coordination: Coordination normal.      Deep Tendon Reflexes: Reflexes are normal and symmetric. Reflexes normal.   Psychiatric:         Judgment: Judgment normal.         ASSESSMENT AND PLANS:      Except as noted below, all chronic problems have been reviewed and are stable to continue medications or other therapy as previously documented in the patient's chart, with changes per orders or documentation below:        Assessment and Plan: Patient received counseling and, if relevant,printed instructions for all symptoms listed in CC and HPI, as well as for all diagnoses brought onto today's visit note below. Typical counseling includes, but is not limited to, non pharmacologic measures to manage listed symptoms and conditions; appropriate use, risks and benefits for all prescribed medications; potential interactions between medications both prescribed and OTC; diet; exercise; healthy behaviors; and goalsetting to improve health. Pt.or responsible party was involved in shared decision making and had opportunity to have all questions answered. 1. Type 2 diabetes mellitus without complication, without long-term current use of insulin (HCC)--improved control, congratulated and encouraged to continue good dietary and med adherence  - POCT glycosylated hemoglobin (Hb A1C)    2.  Hypertension complicating diabetes (HCC)--controlled, but could benefit from additional salt restriction (eating canned soups, etc). 3. Hyperlipidemia associated with type 2 diabetes mellitus ([facility]    4. Skin lesion--refer to derm        Problem List     Hypertension complicating diabetes ([facility]    Relevant Medications    metFORMIN (GLUCOPHAGE) 500 MG tablet    losartan (COZAAR) 25 MG tablet    Hyperlipidemia associated with type 2 diabetes mellitus (HCC)    Relevant Medications    metFORMIN (GLUCOPHAGE) 500 MG tablet    simvastatin (ZOCOR) 40 MG tablet    Type 2 diabetes mellitus without complication (HCC) - Primary    Relevant Medications    metFORMIN (GLUCOPHAGE) 500 MG tablet    Other Relevant Orders    POCT glycosylated hemoglobin (Hb A1C) (Completed)        Orders Placed This Encounter   Procedures    Amb External Referral To Dermatology     Referral Priority:   Routine     Referral Type:   Consult for Advice and Opinion     Referral Reason:   Specialty Services Required     Requested Specialty:   Dermatology     Number of Visits Requested:   1    POCT glycosylated hemoglobin (Hb A1C)       I have reconciled the medications in chart with what patient reports to be taking, andreviewed action/ sideeffects and how to take any new medications. Patient/caregiver understands purpose and side effects. A complete  list of medications was provided in their after-visit summary. Return in about 4 months (around 10/21/2022) for f/u dm etc. extended. Time basedbilling: I spent over 30  minutes with this patient, and as is the nature of primary care and typical for my extended visits, over 50 percent of this visit was spent on counseling and coordination ofcare.

## 2022-06-27 DIAGNOSIS — E11.9 TYPE 2 DIABETES MELLITUS WITHOUT COMPLICATION, WITH LONG-TERM CURRENT USE OF INSULIN (HCC): ICD-10-CM

## 2022-06-27 DIAGNOSIS — Z79.4 TYPE 2 DIABETES MELLITUS WITHOUT COMPLICATION, WITH LONG-TERM CURRENT USE OF INSULIN (HCC): ICD-10-CM

## 2022-06-27 NOTE — TELEPHONE ENCOUNTER
Requested Prescriptions     Pending Prescriptions Disp Refills    metFORMIN (GLUCOPHAGE) 500 MG tablet [Pharmacy Med Name: METFORMIN 500MG TABLETS] 90 tablet 3     Sig: TAKE 1 TABLET BY MOUTH DAILY WITH BREAKFAST         Patient requesting a medication refill.   Last filled on:   Pharmacy: [facility]  Next office visit: 10/21/2022  Last regular office visit: 6/21/2022

## 2022-10-06 DIAGNOSIS — E11.69 HYPERLIPIDEMIA ASSOCIATED WITH TYPE 2 DIABETES MELLITUS (HCC): ICD-10-CM

## 2022-10-06 DIAGNOSIS — E78.5 HYPERLIPIDEMIA ASSOCIATED WITH TYPE 2 DIABETES MELLITUS (HCC): ICD-10-CM

## 2022-10-06 RX ORDER — SIMVASTATIN 40 MG
TABLET ORAL
Qty: 90 TABLET | Refills: 3 | Status: SHIPPED | OUTPATIENT
Start: 2022-10-06

## 2022-10-06 NOTE — TELEPHONE ENCOUNTER
Requested Prescriptions     Pending Prescriptions Disp Refills    simvastatin (ZOCOR) 40 MG tablet [Pharmacy Med Name: SIMVASTATIN 40MG TABLETS] 90 tablet 3     Sig: TAKE 1 TABLET BY MOUTH EVERY NIGHT     Patient requesting a medication refill.     Next office visit: 10/21/2022    Last regular office visit: 6/21/2022

## 2022-10-21 ENCOUNTER — OFFICE VISIT (OUTPATIENT)
Dept: INTERNAL MEDICINE CLINIC | Age: 72
End: 2022-10-21
Payer: MEDICARE

## 2022-10-21 VITALS
RESPIRATION RATE: 12 BRPM | WEIGHT: 194.2 LBS | OXYGEN SATURATION: 99 % | SYSTOLIC BLOOD PRESSURE: 128 MMHG | HEART RATE: 93 BPM | BODY MASS INDEX: 25.62 KG/M2 | DIASTOLIC BLOOD PRESSURE: 83 MMHG

## 2022-10-21 DIAGNOSIS — E11.69 HYPERLIPIDEMIA ASSOCIATED WITH TYPE 2 DIABETES MELLITUS (HCC): ICD-10-CM

## 2022-10-21 DIAGNOSIS — E11.59 HYPERTENSION COMPLICATING DIABETES (HCC): Primary | ICD-10-CM

## 2022-10-21 DIAGNOSIS — E11.29 DIABETES MELLITUS WITH PROTEINURIA (HCC): ICD-10-CM

## 2022-10-21 DIAGNOSIS — E78.5 HYPERLIPIDEMIA ASSOCIATED WITH TYPE 2 DIABETES MELLITUS (HCC): ICD-10-CM

## 2022-10-21 DIAGNOSIS — R80.9 DIABETES MELLITUS WITH PROTEINURIA (HCC): ICD-10-CM

## 2022-10-21 DIAGNOSIS — I15.2 HYPERTENSION COMPLICATING DIABETES (HCC): Primary | ICD-10-CM

## 2022-10-21 PROCEDURE — 99214 OFFICE O/P EST MOD 30 MIN: CPT | Performed by: INTERNAL MEDICINE

## 2022-10-21 PROCEDURE — 1123F ACP DISCUSS/DSCN MKR DOCD: CPT | Performed by: INTERNAL MEDICINE

## 2022-10-21 PROCEDURE — 90694 VACC AIIV4 NO PRSRV 0.5ML IM: CPT | Performed by: INTERNAL MEDICINE

## 2022-10-21 PROCEDURE — 3044F HG A1C LEVEL LT 7.0%: CPT | Performed by: INTERNAL MEDICINE

## 2022-10-21 PROCEDURE — G0008 ADMIN INFLUENZA VIRUS VAC: HCPCS | Performed by: INTERNAL MEDICINE

## 2022-10-21 NOTE — PROGRESS NOTES
Chief Complaint   Patient presents with    Diabetes    Flu Vaccine       HPI: Here for diabetes followup and management of multiple chronic conditions as per the active problems list on chart, which I reviewed and updated with the patient today. States doing well with no new concerns except if noted below. I have reviewed the chart notes available from myself and other providers. I have reviewed and addressed all active problems and created or updated the problems list in detail, as needed. No problem-specific Assessment & Plan notes found for this encounter.       I have extensively reviewed and reconciled the medication list, discontinued medications not taking or no longer appropriate, and updated the active meds list      Lab Results   Component Value Date    LABA1C 6.8 06/21/2022    LABA1C 8.4 02/21/2022    LABA1C 7.7 06/08/2021    LABMICR 0.35 10/10/2012    LABMICR 1.02 07/11/2012    LABMICR 0.70 05/16/2012       Lab Results   Component Value Date     02/21/2022     02/08/2021     01/21/2020    K 4.8 02/21/2022    K 5.1 02/08/2021    K 4.8 01/21/2020    CL 97 (L) 02/21/2022     02/08/2021    CL 97 (L) 01/21/2020    CO2 24 02/21/2022    CO2 23 02/08/2021    CO2 27 01/21/2020    BUN 10 02/21/2022    BUN 12 02/08/2021    BUN 12 01/21/2020    CREATININE 0.9 02/21/2022    CREATININE 0.9 02/08/2021    CREATININE 0.8 01/21/2020    GLUCOSE 149 (H) 02/21/2022    GLUCOSE 161 (H) 02/08/2021    GLUCOSE 114 (H) 01/21/2020    CALCIUM 9.4 02/21/2022    CALCIUM 9.8 02/08/2021    CALCIUM 9.3 01/21/2020       Lab Results   Component Value Date    CHOL 124 02/21/2022    CHOL 145 02/08/2021    CHOL 117 01/21/2020    TRIG 166 (H) 02/21/2022    TRIG 211 (H) 02/08/2021    TRIG 139 01/21/2020    HDL 35 (L) 02/21/2022    HDL 40 02/08/2021    HDL 37 (L) 01/21/2020    LDLCALC 56 02/21/2022    LDLCALC 63 02/08/2021    LDLCALC 52 01/21/2020       Lab Results   Component Value Date    ALT 32 02/21/2022 ALT 48 (H) 02/08/2021    ALT 26 01/21/2020    AST 21 02/21/2022    AST 23 02/08/2021    AST 18 01/21/2020       Lab Results   Component Value Date    TSH 1.97 10/02/2015    TSH 1.49 05/22/2015    TSH 2.14 01/22/2015       Lab Results   Component Value Date    WBC 11.2 (H) 10/02/2015    WBC 12.3 (H) 05/22/2015    WBC 10.4 01/22/2015    HGB 16.3 10/02/2015    HGB 15.9 05/22/2015    HGB 15.1 01/22/2015    HCT 48.1 10/02/2015    HCT 48.7 05/22/2015    HCT 47.3 01/22/2015    MCV 88.6 10/02/2015    MCV 88.1 05/22/2015    MCV 89.0 01/22/2015     10/02/2015     05/22/2015     01/22/2015       No results found for: INR     Lab Results   Component Value Date    PSA 0.41 01/10/2017    PSA 0.4 10/02/2015    PSA 0.41 07/01/2013        No results found for: LABURIC          /83   Pulse 93   Resp 12   Wt 194 lb 3.2 oz (88.1 kg)   SpO2 99%   BMI 25.62 kg/m²   Body mass index is 25.62 kg/m². Physical Exam  Constitutional:       General: He is not in acute distress. Appearance: He is not diaphoretic. HENT:      Head: Normocephalic and atraumatic. Nose:      Comments: masked  Eyes:      General: No scleral icterus. Right eye: No discharge. Left eye: No discharge. Conjunctiva/sclera: Conjunctivae normal.      Pupils: Pupils are equal, round, and reactive to light. Neck:      Thyroid: No thyromegaly. Vascular: No JVD. Trachea: No tracheal deviation. Cardiovascular:      Rate and Rhythm: Normal rate and regular rhythm. Heart sounds: Normal heart sounds. No murmur heard. No friction rub. No gallop. Pulmonary:      Effort: Pulmonary effort is normal. No respiratory distress. Breath sounds: Normal breath sounds. No stridor. No wheezing. Chest:      Chest wall: No tenderness. Abdominal:      General: Bowel sounds are normal. There is no distension. Palpations: Abdomen is soft. There is no mass. Tenderness:  There is no abdominal tenderness. There is no right CVA tenderness, left CVA tenderness, guarding or rebound. Musculoskeletal:         General: No tenderness. Normal range of motion. Cervical back: Normal range of motion and neck supple. Lymphadenopathy:      Cervical: No cervical adenopathy. Skin:     General: Skin is warm and dry. Coloration: Skin is not pale. Findings: No erythema or rash. Neurological:      General: No focal deficit present. Mental Status: He is alert and oriented to person, place, and time. Cranial Nerves: No cranial nerve deficit. Motor: No abnormal muscle tone. Coordination: Coordination normal.      Deep Tendon Reflexes: Reflexes are normal and symmetric. Reflexes normal.   Psychiatric:         Mood and Affect: Mood normal.         Behavior: Behavior normal.         Thought Content: Thought content normal.         Judgment: Judgment normal.       ASSESSMENT AND PLANS:      Except as noted below, all chronic problems have been reviewed and are stable to continue medications or other therapy as previously documented in the patient's chart, with changes per orders or documentation below:        Assessment and Plan: Patient received counseling and, if relevant,printed instructions for all symptoms listed in CC and HPI, as well as for all diagnoses brought onto today's visit note below. Typical counseling includes, but is not limited to, non pharmacologic measures to manage listed symptoms and conditions; appropriate use, risks and benefits for all prescribed medications; potential interactions between medications both prescribed and OTC; diet; exercise; healthy behaviors; and goalsetting to improve health. Pt.or responsible party was involved in shared decision making and had opportunity to have all questions answered. 1. Hypertension complicating diabetes (HCC)--initially elevated readings today but improved  before end fo visit.  Pt to monitor at home to ensure adequate control, goal < 130 syst.  -  DIABETES FOOT EXAM    2. Hyperlipidemia associated with type 2 diabetes mellitus (HCC)  -  DIABETES FOOT EXAM    3. Diabetes mellitus with proteinuria ([facility] [address]          Problem List       Diabetes mellitus with proteinuria (HCC)    Relevant Medications    metFORMIN (GLUCOPHAGE) 500 MG tablet    Hypertension complicating diabetes ([facility] [address] - Primary    Relevant Medications    losartan (COZAAR) 25 MG tablet    metFORMIN (GLUCOPHAGE) 500 MG tablet    Other Relevant Orders     DIABETES FOOT EXAM (Completed)    Hyperlipidemia associated with type 2 diabetes mellitus (HCC)    Relevant Medications    aspirin 81 MG tablet    losartan (COZAAR) 25 MG tablet    metFORMIN (GLUCOPHAGE) 500 MG tablet    simvastatin (ZOCOR) 40 MG tablet    Other Relevant Orders     DIABETES FOOT EXAM (Completed)     Orders Placed This Encounter   Procedures    Influenza, FLUAD, (age 72 y+), IM, PF, 0.5 mL     DIABETES FOOT EXAM       I have reconciled the medications in chart with what patient reports to be taking, andreviewed action/ sideeffects and how to take any new medications. Patient/caregiver understands purpose and side effects. A complete  list of medications was provided in their after-visit summary. Return in about 4 months (around 2/21/2023) for AWV; , f/u dm etc. extended with FBW. Time basedbilling: I spent over 30 minutes with this patient, and as is the nature of primary care and typical for my extended visits, over 50 percent of this visit was spent on counseling and coordination ofcare.

## 2023-02-21 ENCOUNTER — OFFICE VISIT (OUTPATIENT)
Dept: INTERNAL MEDICINE CLINIC | Age: 73
End: 2023-02-21
Payer: MEDICARE

## 2023-02-21 VITALS — WEIGHT: 191 LBS | BODY MASS INDEX: 25.31 KG/M2 | HEIGHT: 73 IN

## 2023-02-21 VITALS
WEIGHT: 191.6 LBS | OXYGEN SATURATION: 98 % | DIASTOLIC BLOOD PRESSURE: 82 MMHG | HEART RATE: 85 BPM | BODY MASS INDEX: 25.28 KG/M2 | RESPIRATION RATE: 12 BRPM | SYSTOLIC BLOOD PRESSURE: 138 MMHG

## 2023-02-21 DIAGNOSIS — E11.59 HYPERTENSION COMPLICATING DIABETES (HCC): Primary | ICD-10-CM

## 2023-02-21 DIAGNOSIS — Z00.00 MEDICARE ANNUAL WELLNESS VISIT, SUBSEQUENT: Primary | ICD-10-CM

## 2023-02-21 DIAGNOSIS — I15.2 HYPERTENSION COMPLICATING DIABETES (HCC): Primary | ICD-10-CM

## 2023-02-21 DIAGNOSIS — E78.5 HYPERLIPIDEMIA ASSOCIATED WITH TYPE 2 DIABETES MELLITUS (HCC): ICD-10-CM

## 2023-02-21 DIAGNOSIS — E11.29 DIABETES MELLITUS WITH PROTEINURIA (HCC): ICD-10-CM

## 2023-02-21 DIAGNOSIS — E55.9 VITAMIN D DEFICIENCY: ICD-10-CM

## 2023-02-21 DIAGNOSIS — E11.59 HYPERTENSION COMPLICATING DIABETES (HCC): ICD-10-CM

## 2023-02-21 DIAGNOSIS — R80.9 DIABETES MELLITUS WITH PROTEINURIA (HCC): ICD-10-CM

## 2023-02-21 DIAGNOSIS — L98.9 SKIN LESIONS: ICD-10-CM

## 2023-02-21 DIAGNOSIS — E11.69 HYPERLIPIDEMIA ASSOCIATED WITH TYPE 2 DIABETES MELLITUS (HCC): ICD-10-CM

## 2023-02-21 DIAGNOSIS — I15.2 HYPERTENSION COMPLICATING DIABETES (HCC): ICD-10-CM

## 2023-02-21 LAB
A/G RATIO: 1.7 (ref 1.1–2.2)
ALBUMIN SERPL-MCNC: 4.7 G/DL (ref 3.4–5)
ALP BLD-CCNC: 80 U/L (ref 40–129)
ALT SERPL-CCNC: 21 U/L (ref 10–40)
ANION GAP SERPL CALCULATED.3IONS-SCNC: 14 MMOL/L (ref 3–16)
AST SERPL-CCNC: 19 U/L (ref 15–37)
BILIRUB SERPL-MCNC: 0.3 MG/DL (ref 0–1)
BUN BLDV-MCNC: 11 MG/DL (ref 7–20)
CALCIUM SERPL-MCNC: 9.7 MG/DL (ref 8.3–10.6)
CHLORIDE BLD-SCNC: 98 MMOL/L (ref 99–110)
CHOLESTEROL, TOTAL: 119 MG/DL (ref 0–199)
CO2: 23 MMOL/L (ref 21–32)
CREAT SERPL-MCNC: 0.9 MG/DL (ref 0.8–1.3)
CREATININE URINE: 115.1 MG/DL (ref 39–259)
GFR SERPL CREATININE-BSD FRML MDRD: >60 ML/MIN/{1.73_M2}
GLUCOSE BLD-MCNC: 105 MG/DL (ref 70–99)
HDLC SERPL-MCNC: 41 MG/DL (ref 40–60)
LDL CHOLESTEROL CALCULATED: 51 MG/DL
MICROALBUMIN UR-MCNC: 1.6 MG/DL
MICROALBUMIN/CREAT UR-RTO: 13.9 MG/G (ref 0–30)
POTASSIUM SERPL-SCNC: 4.4 MMOL/L (ref 3.5–5.1)
SODIUM BLD-SCNC: 135 MMOL/L (ref 136–145)
TOTAL PROTEIN: 7.5 G/DL (ref 6.4–8.2)
TRIGL SERPL-MCNC: 136 MG/DL (ref 0–150)
VLDLC SERPL CALC-MCNC: 27 MG/DL

## 2023-02-21 PROCEDURE — 99214 OFFICE O/P EST MOD 30 MIN: CPT | Performed by: INTERNAL MEDICINE

## 2023-02-21 PROCEDURE — 1123F ACP DISCUSS/DSCN MKR DOCD: CPT | Performed by: INTERNAL MEDICINE

## 2023-02-21 PROCEDURE — 3075F SYST BP GE 130 - 139MM HG: CPT | Performed by: INTERNAL MEDICINE

## 2023-02-21 PROCEDURE — G0439 PPPS, SUBSEQ VISIT: HCPCS | Performed by: INTERNAL MEDICINE

## 2023-02-21 PROCEDURE — 3079F DIAST BP 80-89 MM HG: CPT | Performed by: INTERNAL MEDICINE

## 2023-02-21 SDOH — ECONOMIC STABILITY: FOOD INSECURITY: WITHIN THE PAST 12 MONTHS, THE FOOD YOU BOUGHT JUST DIDN'T LAST AND YOU DIDN'T HAVE MONEY TO GET MORE.: NEVER TRUE

## 2023-02-21 SDOH — ECONOMIC STABILITY: INCOME INSECURITY: HOW HARD IS IT FOR YOU TO PAY FOR THE VERY BASICS LIKE FOOD, HOUSING, MEDICAL CARE, AND HEATING?: NOT HARD AT ALL

## 2023-02-21 SDOH — ECONOMIC STABILITY: HOUSING INSECURITY
IN THE LAST 12 MONTHS, WAS THERE A TIME WHEN YOU DID NOT HAVE A STEADY PLACE TO SLEEP OR SLEPT IN A SHELTER (INCLUDING NOW)?: NO

## 2023-02-21 SDOH — ECONOMIC STABILITY: FOOD INSECURITY: WITHIN THE PAST 12 MONTHS, YOU WORRIED THAT YOUR FOOD WOULD RUN OUT BEFORE YOU GOT MONEY TO BUY MORE.: NEVER TRUE

## 2023-02-21 ASSESSMENT — LIFESTYLE VARIABLES
HOW MANY STANDARD DRINKS CONTAINING ALCOHOL DO YOU HAVE ON A TYPICAL DAY: PATIENT DOES NOT DRINK
HOW OFTEN DO YOU HAVE A DRINK CONTAINING ALCOHOL: NEVER

## 2023-02-21 ASSESSMENT — PATIENT HEALTH QUESTIONNAIRE - PHQ9
SUM OF ALL RESPONSES TO PHQ QUESTIONS 1-9: 0
SUM OF ALL RESPONSES TO PHQ9 QUESTIONS 1 & 2: 0
1. LITTLE INTEREST OR PLEASURE IN DOING THINGS: 0
SUM OF ALL RESPONSES TO PHQ QUESTIONS 1-9: 0
2. FEELING DOWN, DEPRESSED OR HOPELESS: 0
SUM OF ALL RESPONSES TO PHQ QUESTIONS 1-9: 0
SUM OF ALL RESPONSES TO PHQ QUESTIONS 1-9: 0

## 2023-02-21 NOTE — PROGRESS NOTES
Medicare Annual Wellness Visit    Magno Chavarria is here for scheduled E and M visit but also due for AWV added on with LPN. Assessment & Plan    Recommendations for Preventive Services Due: see orders and patient instructions/AVS.  Recommended screening schedule for the next 5-10 years is provided to the patient in written form: see Patient Instructions/AVS.     No follow-ups on file. Subjective       Patient's complete Health Risk Assessment and screening values have been reviewed and are found in Flowsheets. The following problems were reviewed today and where indicated follow up appointments were made and/or referrals ordered. Positive Risk Factor Screenings with Interventions:    Fall Risk:        Interventions:    See AVS for additional education material                                    Objective   Vitals:    02/21/23 0841   Weight: 191 lb (86.6 kg)   Height: 6' 1\" (1.854 m)      Body mass index is 25.2 kg/m². Allergies   Allergen Reactions    Sulfa Antibiotics      Prior to Visit Medications    Medication Sig Taking?  Authorizing Provider   simvastatin (ZOCOR) 40 MG tablet TAKE 1 TABLET BY MOUTH EVERY NIGHT  Javid Zamudio MD   metFORMIN (GLUCOPHAGE) 500 MG tablet TAKE 1 TABLET BY MOUTH DAILY WITH BREAKFAST  Javid Zamudio MD   losartan (COZAAR) 25 MG tablet Take 1 tablet by mouth daily  Javid Zamudio MD   Multiple Vitamins-Minerals (THERAPEUTIC MULTIVITAMIN-MINERALS) tablet Take 1 tablet by mouth daily  Historical Provider, MD   Cholecalciferol (VITAMIN D3) 1000 units CAPS Take 1,000 Units by mouth 2 times daily  Javid Zamudio MD   aspirin 81 MG tablet Take 81 mg by mouth daily  Historical Provider, MD   vitamin B-12 (CYANOCOBALAMIN) 100 MCG tablet Take 50 mcg by mouth daily  Historical Provider, MD   Ascorbic Acid (VITAMIN C) 250 MG tablet Take 250 mg by mouth daily  Historical ProviderMD Fleming (Including outside providers/suppliers regularly involved in providing care):   Patient Care Team:  Teetee Multani MD as PCP - General (Internal Medicine)  Teetee Multani MD as PCP - Empaneled Provider  Ariella Regan RN as Nurse Navigator     Reviewed and updated this visit:  Tobacco  Allergies  Meds  Med Hx  Surg Hx  Soc Hx  Fam Hx        I, Kae Lizama LPN, [birth year], performed the documented evaluation under the direct supervision of the attending physician. Kae Lizama LPN     This encounter was performed under myAmber MDs, direct supervision, 2/21/2023.

## 2023-02-21 NOTE — PATIENT INSTRUCTIONS
Personalized Preventive Plan for Oren Reid - 2/21/2023  Medicare offers a range of preventive health benefits. Some of the tests and screenings are paid in full while other may be subject to a deductible, co-insurance, and/or copay. Some of these benefits include a comprehensive review of your medical history including lifestyle, illnesses that may run in your family, and various assessments and screenings as appropriate. After reviewing your medical record and screening and assessments performed today your provider may have ordered immunizations, labs, imaging, and/or referrals for you. A list of these orders (if applicable) as well as your Preventive Care list are included within your After Visit Summary for your review. Other Preventive Recommendations:    A preventive eye exam performed by an eye specialist is recommended every 1-2 years to screen for glaucoma; cataracts, macular degeneration, and other eye disorders. A preventive dental visit is recommended every 6 months. Try to get at least 150 minutes of exercise per week or 10,000 steps per day on a pedometer . Order or download the FREE \"Exercise & Physical Activity: Your Everyday Guide\" from [employer]. Call 9-279.207.4418 or search [employer] online. You need 3742-2364 mg of calcium and 0647-6027 IU of vitamin D per day. It is possible to meet your calcium requirement with diet alone, but a vitamin D supplement is usually necessary to meet this goal.  When exposed to the sun, use a sunscreen that protects against both UVA and UVB radiation with an SPF of 30 or greater. Reapply every 2 to 3 hours or after sweating, drying off with a towel, or swimming. Always wear a seat belt when traveling in a car. Always wear a helmet when riding a bicycle or motorcycle.   Keeping Home a [facility]       As we get older, changes in balance, gait, strength, vision, hearing, and cognition make even the most youthful senior more prone to accidents. Falls are one of the leading health risks for older people. This increased risk of falling is related to:   Aging process (eg, decreased muscle strength, slowed reflexes)   Higher incidence of chronic health problems (eg, arthritis, diabetes) that may limit mobility, agility or sensory awareness   Side effects of medicine (eg, dizziness, blurred vision)especially medicines like prescription pain medicines and drugs used to treat mental health conditions   Depending on the brittleness of your bones, the consequences of a fall can be serious and long lasting. Home Life   Research by the [employer] [facility]) shows that some home accidents among older adults can be prevented by making simple lifestyle changes and basic modifications and repairs to the home environment. Here are some lifestyle changes that experts recommend:   Have your hearing and vision checked regularly. Be sure to wear prescription glasses that are right for you. Speak to your doctor or pharmacist about the possible side effects of your medicines. A number of medicines can cause dizziness. If you have problems with sleep, talk to your doctor. Limit your intake of alcohol. If necessary, use a cane or walker to help maintain your balance. Wear supportive, rubber-soled shoes, even at home. If you live in a region that gets wintry weather, you may want to put special cleats on your shoes to prevent you from slipping on the snow and ice. Exercise regularly to help maintain muscle tone, agility, and balance. Always hold the banister when going up or down stairs. Also, use  bars when getting in or out of the bath or shower, or using the toilet. To avoid dizziness, get up slowly from a lying down position. Sit up first, dangling your legs for a minute or two before rising to a standing position.    Overall Home Safety Check   According to the Consumer Product Safety Commision's \"Older Consumer Home Safety Checklist,\" it is important to check for potential hazards in each room. And remember, proper lighting is an essential factor in home safety. If you cannot see clearly, you are more likely to fall. Important questions to ask yourself include:   Are lamp, electric, extension, and telephone cords placed out of the flow of traffic and maintained in good condition? Have frayed cords been replaced? Are all small rugs and runners slip resistant? If not, you can secure them to the floor with a special double-sided carpet tape. Are smoke detectors properly locatedone on every floor of your home and one outside of every sleeping area? Are they in good working order? Are batteries replaced at least once a year? Do you have a well-maintained carbon monoxide detector outside every sleeping are in your home? Does your furniture layout leave plenty of space to maneuver between and around chairs, tables, beds, and sofas? Are hallways, stairs and passages between rooms well lit? Can you reach a lamp without getting out of bed? Are floor surfaces well maintained? Shag rugs, high-pile carpeting, tile floors, and polished wood floors can be particularly slippery. Stairs should always have handrails and be carpeted or fitted with a non-skid tread. Is your telephone easily reachable. Is the cord safely tucked away? Room by Room   According to the Association of Aging, bathrooms and fernando are the two most potentially hazardous rooms in your home. In the Kitchen    Be sure your stove is in proper working order and always make sure burners and the oven are off before you go out or go to sleep. Keep pots on the back burners, turn handles away from the front of the stove, and keep stove clean and free of grease build-up. Kitchen ventilation systems and range exhausts should be working properly.     Keep flammable objects such as towels and pot holders away from the cooking area except when in use. Make sure kitchen curtains are tied back. Move cords and appliances away from the sink and hot surfaces. If extension cords are needed, install wiring guides so they do not hang over the sink, range, or working areas. Look for coffee pots, kettles and toaster ovens with automatic shut-offs. Keep a mop handy in the kitchen so you can wipe up spills instantly. You should also have a small fire extinguisher. Arrange your kitchen with frequently used items on lower shelves to avoid the need to stand on a stepstool to reach them. Make sure countertops are well-lit to avoid injuries while cutting and preparing food. In the Bathroom    Use a non-slip mat or decals in the tub and shower, since wet, soapy tile or porcelain surfaces are extremely slippery. Make sure bathroom rugs are non-skid or tape them firmly to the floor. Bathtubs should have at least one, preferably two, grab bars, firmly attached to structural supports in the wall. (Do not use built-in soap holders or glass shower doors as grab bars.)    Tub seats fitted with non-slip material on the legs allow you to wash sitting down. For people with limited mobility, bathtub transfer benches allow you to slide safely into the tub. Raised toilet seats and toilet safety rails are helpful for those with knee or hip problems. In the [facility]    Make sure you use a nightlight and that the area around your bed is clear of potential obstacles. Be careful with electric blankets and never go to sleep with a heating pad, which can cause serious burns even if on a low setting. Use fire-resistant mattress covers and pillows, and NEVER smoke in bed. Keep a phone next to the bed that is programmed to dial 911 at the push of a button. If you have a chronic condition, you may want to sign on with an automatic call-in service.  Typically the system includes a small pendant that connects directly to an emergency medical voice-response system. You should also make arrangements to stay in contact with someonefriend, neighbor, family memberon a regular schedule. Fire Prevention   According to the [employer]. (Smoke Alarms for Every) [address] [facility], senior citizens are one of the two highest risk groups for death and serious injuries due to residential fires. When cooking, wear short-sleeved items, never a bulky long-sleeved robe. The [facility]'s Safety Checklist for Older Consumers emphasizes the importance of checking basements, garages, workshops and storage areas for fire hazards, such as volatile liquids, piles of old rags or clothing and overloaded circuits. Never smoke in bed or when lying down on a couch or recliner chair. Small portable electric or kerosene heaters are responsible for many home fires and should be used cautiously if at all. If you do use one, be sure to keep them away from flammable materials. In case of fire, make sure you have a pre-established emergency exit plan. Have a professional check your fireplace and other fuel-burning appliances yearly. [employer]   Baby boomers entering the tse years will continue to see the development of new products to help older adults live safely and independently in spite of age-related changes. Making Life More Livable  , by Laura Guevara, lists over 1,000 products for \"living well in the mature years,\" such as bathing and mobility aids, household security devices, ergonomically designed knives and peelers, and faucet valves and knobs for temperature control. Medical supply stores and organizations are good sources of information about products that improve your quality of life and insure your safety.      Last Reviewed: November 2009 Eric Toro MD   Updated: 3/7/2011

## 2023-02-21 NOTE — PROGRESS NOTES
Chief Complaint   Patient presents with    Diabetes       HPI: Here for diabetes followup and management of multiple chronic conditions as per the active problems list on chart, which I reviewed and updated with the patient today. States doing well with no new concerns except if noted below. Note elevated blood pressures today. Pt reports home readings about 120/80s however. States good adherence to diet and meds, exercises 2-3 times per week and takes the steps in his home often. But feels he is getting stiffer with age. I have reviewed the chart notes available from myself and other providers. I have reviewed and addressed all active problems and created or updated the problems list in detail, as needed. No problem-specific Assessment & Plan notes found for this encounter.       I have extensively reviewed and reconciled the medication list, discontinued medications not taking or no longer appropriate, and updated the active meds list      Lab Results   Component Value Date    LABA1C 6.8 06/21/2022    LABA1C 8.4 02/21/2022    LABA1C 7.7 06/08/2021    LABMICR 0.35 10/10/2012    LABMICR 1.02 07/11/2012    LABMICR 0.70 05/16/2012       Lab Results   Component Value Date     02/21/2022     02/08/2021     01/21/2020    K 4.8 02/21/2022    K 5.1 02/08/2021    K 4.8 01/21/2020    CL 97 (L) 02/21/2022     02/08/2021    CL 97 (L) 01/21/2020    CO2 24 02/21/2022    CO2 23 02/08/2021    CO2 27 01/21/2020    BUN 10 02/21/2022    BUN 12 02/08/2021    BUN 12 01/21/2020    CREATININE 0.9 02/21/2022    CREATININE 0.9 02/08/2021    CREATININE 0.8 01/21/2020    GLUCOSE 149 (H) 02/21/2022    GLUCOSE 161 (H) 02/08/2021    GLUCOSE 114 (H) 01/21/2020    CALCIUM 9.4 02/21/2022    CALCIUM 9.8 02/08/2021    CALCIUM 9.3 01/21/2020       Lab Results   Component Value Date    CHOL 124 02/21/2022    CHOL 145 02/08/2021    CHOL 117 01/21/2020    TRIG 166 (H) 02/21/2022    TRIG 211 (H) 02/08/2021    TRIG 139 01/21/2020    HDL 35 (L) 02/21/2022    HDL 40 02/08/2021    HDL 37 (L) 01/21/2020    LDLCALC 56 02/21/2022    LDLCALC 63 02/08/2021    LDLCALC 52 01/21/2020       Lab Results   Component Value Date    ALT 32 02/21/2022    ALT 48 (H) 02/08/2021    ALT 26 01/21/2020    AST 21 02/21/2022    AST 23 02/08/2021    AST 18 01/21/2020       Lab Results   Component Value Date    TSH 1.97 10/02/2015    TSH 1.49 05/22/2015    TSH 2.14 01/22/2015       Lab Results   Component Value Date    WBC 11.2 (H) 10/02/2015    WBC 12.3 (H) 05/22/2015    WBC 10.4 01/22/2015    HGB 16.3 10/02/2015    HGB 15.9 05/22/2015    HGB 15.1 01/22/2015    HCT 48.1 10/02/2015    HCT 48.7 05/22/2015    HCT 47.3 01/22/2015    MCV 88.6 10/02/2015    MCV 88.1 05/22/2015    MCV 89.0 01/22/2015     10/02/2015     05/22/2015     01/22/2015       No results found for: INR     Lab Results   Component Value Date    PSA 0.41 01/10/2017    PSA 0.4 10/02/2015    PSA 0.41 07/01/2013        No results found for: LABURIC          /82   Pulse 85   Resp 12   Wt 191 lb 9.6 oz (86.9 kg)   SpO2 98%   BMI 25.28 kg/m²   Body mass index is 25.28 kg/m². Physical Exam  Constitutional:       General: He is not in acute distress. Appearance: He is not diaphoretic. HENT:      Head: Normocephalic and atraumatic. Nose:      Comments: masked  Eyes:      General: No scleral icterus. Right eye: No discharge. Left eye: No discharge. Conjunctiva/sclera: Conjunctivae normal.      Pupils: Pupils are equal, round, and reactive to light. Neck:      Thyroid: No thyromegaly. Vascular: No JVD. Trachea: No tracheal deviation. Cardiovascular:      Rate and Rhythm: Normal rate and regular rhythm. Heart sounds: Normal heart sounds. No murmur heard. No friction rub. No gallop. Pulmonary:      Effort: Pulmonary effort is normal. No respiratory distress. Breath sounds: Normal breath sounds. No stridor.  No wheezing. Chest:      Chest wall: No tenderness. Abdominal:      General: Bowel sounds are normal. There is no distension. Palpations: Abdomen is soft. There is no mass. Tenderness: There is no abdominal tenderness. There is no right CVA tenderness, left CVA tenderness, guarding or rebound. Musculoskeletal:         General: No tenderness. Normal range of motion. Cervical back: Normal range of motion and neck supple. Lymphadenopathy:      Cervical: No cervical adenopathy. Skin:     General: Skin is warm and dry. Coloration: Skin is not pale. Findings: Lesion (hyperkeratotic lesion right forearm and numerous nevi and AKs scattered) present. No erythema or rash. Neurological:      General: No focal deficit present. Mental Status: He is alert and oriented to person, place, and time. Cranial Nerves: No cranial nerve deficit. Motor: No abnormal muscle tone. Coordination: Coordination normal.      Deep Tendon Reflexes: Reflexes are normal and symmetric. Reflexes normal.   Psychiatric:         Mood and Affect: Mood normal.         Behavior: Behavior normal.         Thought Content: Thought content normal.         Judgment: Judgment normal.       ASSESSMENT AND PLANS:      Except as noted below, all chronic problems have been reviewed and are stable to continue medications or other therapy as previously documented in the patient's chart, with changes per orders or documentation below:        Assessment and Plan: Patient received counseling and, if relevant,printed instructions for all symptoms listed in CC and HPI, as well as for all diagnoses brought onto today's visit note below.  Typical counseling includes, but is not limited to, non pharmacologic measures to manage listed symptoms and conditions; appropriate use, risks and benefits for all prescribed medications; potential interactions between medications both prescribed and OTC; diet; exercise; healthy behaviors; and goalsetting to improve health. Pt.or responsible party was involved in shared decision making and had opportunity to have all questions answered. 1. Hypertension complicating diabetes (HCC)--elevated today but controlled per home readings so no med changes  - Comprehensive Metabolic Panel; Future  - LIPID PANEL; Future  - Hemoglobin A1C; Future  - MICROALBUMIN / CREATININE URINE RATIO; Future    2. Hyperlipidemia associated with type 2 diabetes mellitus ([address]    3. Diabetes mellitus with proteinuria ([address]    4. Vitamin D deficiency--continue vit D supps    5.  Skin lesions  - Amb External Referral To Dermatology            Problem List       Diabetes mellitus with proteinuria (HCC)    Relevant Medications    metFORMIN (GLUCOPHAGE) 500 MG tablet    Hypertension complicating diabetes ([address] - Primary    Relevant Medications    losartan (COZAAR) 25 MG tablet    metFORMIN (GLUCOPHAGE) 500 MG tablet    Other Relevant Orders    Comprehensive Metabolic Panel    LIPID PANEL    Hemoglobin A1C    MICROALBUMIN / CREATININE URINE RATIO    Hyperlipidemia associated with type 2 diabetes mellitus (HCC)    Relevant Medications    aspirin 81 MG tablet    losartan (COZAAR) 25 MG tablet    metFORMIN (GLUCOPHAGE) 500 MG tablet    simvastatin (ZOCOR) 40 MG tablet    Vitamin D deficiency    Relevant Medications    Cholecalciferol (VITAMIN D3) 1000 units CAPS     Orders Placed This Encounter   Procedures    Comprehensive Metabolic Panel     Standing Status:   Future     Standing Expiration Date:   2/21/2024    LIPID PANEL     Standing Status:   Future     Standing Expiration Date:   2/21/2024    Hemoglobin A1C     Standing Status:   Future     Standing Expiration Date:   2/21/2024    MICROALBUMIN / CREATININE URINE RATIO     Standing Status:   Future     Standing Expiration Date:   2/21/2024    Amb External Referral To Dermatology     Referral Priority:   Routine     Referral Type:   Consult for Advice and Opinion     Referral Reason:   Specialty Services Required     Requested Specialty:   Dermatology     Number of Visits Requested:   1       I have reconciled the medications in chart with what patient reports to be taking, andreviewed action/ sideeffects and how to take any new medications. Patient/caregiver understands purpose and side effects. A complete  list of medications was provided in their after-visit summary. Return in about 4 months (around 6/21/2023) for f/u dm etc. extended. Time basedbilling: I spent over 30 minutes with this patient, and as is the nature of primary care and typical for my extended visits, over 50 percent of this visit was spent on counseling and coordination ofcare.

## 2023-02-22 LAB
ESTIMATED AVERAGE GLUCOSE: 134.1 MG/DL
HBA1C MFR BLD: 6.3 %

## 2023-05-09 DIAGNOSIS — E11.59 HYPERTENSION COMPLICATING DIABETES (HCC): ICD-10-CM

## 2023-05-09 DIAGNOSIS — I15.2 HYPERTENSION COMPLICATING DIABETES (HCC): ICD-10-CM

## 2023-05-09 RX ORDER — LOSARTAN POTASSIUM 25 MG/1
25 TABLET ORAL DAILY
Qty: 90 TABLET | Refills: 0 | Status: SHIPPED | OUTPATIENT
Start: 2023-05-09

## 2023-06-19 DIAGNOSIS — Z79.4 TYPE 2 DIABETES MELLITUS WITHOUT COMPLICATION, WITH LONG-TERM CURRENT USE OF INSULIN (HCC): ICD-10-CM

## 2023-06-19 DIAGNOSIS — E11.9 TYPE 2 DIABETES MELLITUS WITHOUT COMPLICATION, WITH LONG-TERM CURRENT USE OF INSULIN (HCC): ICD-10-CM

## 2023-06-22 ENCOUNTER — OFFICE VISIT (OUTPATIENT)
Dept: INTERNAL MEDICINE CLINIC | Age: 73
End: 2023-06-22
Payer: MEDICARE

## 2023-06-22 VITALS
BODY MASS INDEX: 25.7 KG/M2 | SYSTOLIC BLOOD PRESSURE: 130 MMHG | WEIGHT: 194.8 LBS | OXYGEN SATURATION: 96 % | DIASTOLIC BLOOD PRESSURE: 77 MMHG | RESPIRATION RATE: 12 BRPM | HEART RATE: 91 BPM

## 2023-06-22 DIAGNOSIS — Z87.891 PERSONAL HISTORY OF TOBACCO USE: ICD-10-CM

## 2023-06-22 DIAGNOSIS — E11.29 DIABETES MELLITUS WITH PROTEINURIA (HCC): ICD-10-CM

## 2023-06-22 DIAGNOSIS — E11.59 HYPERTENSION COMPLICATING DIABETES (HCC): Primary | ICD-10-CM

## 2023-06-22 DIAGNOSIS — E78.5 HYPERLIPIDEMIA ASSOCIATED WITH TYPE 2 DIABETES MELLITUS (HCC): ICD-10-CM

## 2023-06-22 DIAGNOSIS — I15.2 HYPERTENSION COMPLICATING DIABETES (HCC): Primary | ICD-10-CM

## 2023-06-22 DIAGNOSIS — E11.69 HYPERLIPIDEMIA ASSOCIATED WITH TYPE 2 DIABETES MELLITUS (HCC): ICD-10-CM

## 2023-06-22 DIAGNOSIS — R80.9 DIABETES MELLITUS WITH PROTEINURIA (HCC): ICD-10-CM

## 2023-06-22 LAB — HBA1C MFR BLD: 6.2 %

## 2023-06-22 PROCEDURE — 1123F ACP DISCUSS/DSCN MKR DOCD: CPT | Performed by: INTERNAL MEDICINE

## 2023-06-22 PROCEDURE — 3044F HG A1C LEVEL LT 7.0%: CPT | Performed by: INTERNAL MEDICINE

## 2023-06-22 PROCEDURE — G0296 VISIT TO DETERM LDCT ELIG: HCPCS | Performed by: INTERNAL MEDICINE

## 2023-06-22 PROCEDURE — 3078F DIAST BP <80 MM HG: CPT | Performed by: INTERNAL MEDICINE

## 2023-06-22 PROCEDURE — 99214 OFFICE O/P EST MOD 30 MIN: CPT | Performed by: INTERNAL MEDICINE

## 2023-06-22 PROCEDURE — 3074F SYST BP LT 130 MM HG: CPT | Performed by: INTERNAL MEDICINE

## 2023-06-22 PROCEDURE — 83037 HB GLYCOSYLATED A1C HOME DEV: CPT | Performed by: INTERNAL MEDICINE

## 2023-07-21 ENCOUNTER — HOSPITAL ENCOUNTER (OUTPATIENT)
Dept: CT IMAGING | Age: 73
Discharge: HOME | End: 2023-07-21
Payer: MEDICARE

## 2023-07-21 DIAGNOSIS — Z87.891 PERSONAL HISTORY OF TOBACCO USE: ICD-10-CM

## 2023-07-21 PROCEDURE — 71271 CT THORAX LUNG CANCER SCR C-: CPT

## 2023-08-07 DIAGNOSIS — E11.59 HYPERTENSION COMPLICATING DIABETES (HCC): ICD-10-CM

## 2023-08-07 DIAGNOSIS — I15.2 HYPERTENSION COMPLICATING DIABETES (HCC): ICD-10-CM

## 2023-08-07 RX ORDER — LOSARTAN POTASSIUM 25 MG/1
25 TABLET ORAL DAILY
Qty: 90 TABLET | Refills: 0 | Status: SHIPPED | OUTPATIENT
Start: 2023-08-07

## 2023-10-30 DIAGNOSIS — I15.2 HYPERTENSION COMPLICATING DIABETES (HCC): ICD-10-CM

## 2023-10-30 DIAGNOSIS — E11.59 HYPERTENSION COMPLICATING DIABETES (HCC): ICD-10-CM

## 2023-10-31 RX ORDER — LOSARTAN POTASSIUM 25 MG/1
25 TABLET ORAL DAILY
Qty: 90 TABLET | Refills: 0 | Status: SHIPPED | OUTPATIENT
Start: 2023-10-31

## 2023-12-22 PROBLEM — E11.628 TYPE 2 DIABETES MELLITUS WITH PRESSURE CALLUS (HCC): Status: ACTIVE | Noted: 2023-12-22

## 2023-12-22 PROBLEM — L84 TYPE 2 DIABETES MELLITUS WITH PRESSURE CALLUS (HCC): Status: ACTIVE | Noted: 2023-12-22

## 2024-01-01 DIAGNOSIS — E78.5 HYPERLIPIDEMIA ASSOCIATED WITH TYPE 2 DIABETES MELLITUS (HCC): ICD-10-CM

## 2024-01-01 DIAGNOSIS — E11.69 HYPERLIPIDEMIA ASSOCIATED WITH TYPE 2 DIABETES MELLITUS (HCC): ICD-10-CM

## 2024-01-02 RX ORDER — SIMVASTATIN 40 MG
TABLET ORAL
Qty: 90 TABLET | Refills: 3 | Status: SHIPPED | OUTPATIENT
Start: 2024-01-02

## 2024-01-16 ENCOUNTER — TELEPHONE (OUTPATIENT)
Dept: CARDIOLOGY CLINIC | Age: 74
End: 2024-01-16

## 2024-01-16 NOTE — TELEPHONE ENCOUNTER
Attempted to call patient to set up new patient referral from Dr. Ocampo for peripheral work up. LVM to return call.   May schedule patient for first available appt with Dr. Hernandez. Thanks

## 2024-01-19 NOTE — PROGRESS NOTES
Interventional Cardiology Consultation     Darin LOMELI Driss  1950    PCP: Brenda Long MD  Referring Physician: Dr. Ocampo  Reason for Referral: peripheral work up   Chief Complaint: No chief complaint on file.      Subjective:   History of Present Illness: The patient is 73 y.o. male with a past medical history significant for HTN, HLD, and DM type 2. Patient here today work a further peripheral work up per his podiatrist Dr. Ocampo. Patient reports history of neuropathy for the last couple years. Denies any ulcers or wounds to the feet. Denies claudication symptoms. States his diabetes is usually well controlled.           Past Medical History:   Diagnosis Date    COPD (chronic obstructive pulmonary disease) ([facility]) 03.24.10    Diabetes mellitus type 2, uncontrolled 11.16.11    Fatigue 02.18.09    Hematuria 11.14.05    Hyperlipidemia 12.09.09    Hyperlipidemia 08.21.09    Hypertension 08.21.09    IFG (impaired fasting glucose)     Impaired fasting glucose 12.09.09    Routine general medical examination at a health care facility 03.24.10    Screening for lipoid disorders 02.18.09    Shortness of breath 02.18.09    Urinary tract infection 11.14.05     Past Surgical History:   Procedure Laterality Date    EYE SURGERY      cataracts     No family history on file.  Social History     Tobacco Use    Smoking status: Former     Current packs/day: 0.00     Average packs/day: 15.0 packs/day for 25.0 years (375.0 ttl pk-yrs)     Types: Cigarettes     Start date: 2/6/1988     Quit date: 2/6/2013     Years since quitting: 10.9    Smokeless tobacco: Never    Tobacco comments:     NAF 07/01/2013   Substance Use Topics    Alcohol use: Yes     Comment: Occasional    Drug use: No       Allergies   Allergen Reactions    Sulfa Antibiotics        Current Outpatient Medications   Medication Sig Dispense Refill    simvastatin (ZOCOR) 40 MG tablet TAKE 1 TABLET BY MOUTH EVERY NIGHT 90 tablet 3    losartan (COZAAR) 25

## 2024-01-23 ENCOUNTER — OFFICE VISIT (OUTPATIENT)
Dept: CARDIOLOGY CLINIC | Age: 74
End: 2024-01-23

## 2024-01-23 VITALS
WEIGHT: 196 LBS | DIASTOLIC BLOOD PRESSURE: 78 MMHG | BODY MASS INDEX: 25.86 KG/M2 | HEART RATE: 95 BPM | SYSTOLIC BLOOD PRESSURE: 132 MMHG | OXYGEN SATURATION: 96 %

## 2024-01-23 DIAGNOSIS — E78.5 HYPERLIPIDEMIA ASSOCIATED WITH TYPE 2 DIABETES MELLITUS (HCC): Primary | ICD-10-CM

## 2024-01-23 DIAGNOSIS — E11.69 HYPERLIPIDEMIA ASSOCIATED WITH TYPE 2 DIABETES MELLITUS (HCC): Primary | ICD-10-CM

## 2024-01-23 RX ORDER — AMMONIUM LACTATE 12 G/100G
1 CREAM TOPICAL
COMMUNITY
Start: 2024-01-15

## 2024-01-29 DIAGNOSIS — E11.59 HYPERTENSION COMPLICATING DIABETES (HCC): ICD-10-CM

## 2024-01-29 DIAGNOSIS — I15.2 HYPERTENSION COMPLICATING DIABETES (HCC): ICD-10-CM

## 2024-01-30 RX ORDER — LOSARTAN POTASSIUM 25 MG/1
25 TABLET ORAL DAILY
Qty: 90 TABLET | Refills: 0 | Status: SHIPPED | OUTPATIENT
Start: 2024-01-30

## 2024-04-19 ASSESSMENT — PATIENT HEALTH QUESTIONNAIRE - PHQ9
1. LITTLE INTEREST OR PLEASURE IN DOING THINGS: NOT AT ALL
SUM OF ALL RESPONSES TO PHQ9 QUESTIONS 1 & 2: 0
2. FEELING DOWN, DEPRESSED OR HOPELESS: NOT AT ALL
SUM OF ALL RESPONSES TO PHQ QUESTIONS 1-9: 0
SUM OF ALL RESPONSES TO PHQ9 QUESTIONS 1 & 2: 0
SUM OF ALL RESPONSES TO PHQ QUESTIONS 1-9: 0
2. FEELING DOWN, DEPRESSED OR HOPELESS: NOT AT ALL
1. LITTLE INTEREST OR PLEASURE IN DOING THINGS: NOT AT ALL
SUM OF ALL RESPONSES TO PHQ QUESTIONS 1-9: 0
SUM OF ALL RESPONSES TO PHQ QUESTIONS 1-9: 0

## 2024-04-22 ENCOUNTER — OFFICE VISIT (OUTPATIENT)
Dept: INTERNAL MEDICINE CLINIC | Age: 74
End: 2024-04-22
Payer: MEDICARE

## 2024-04-22 VITALS
SYSTOLIC BLOOD PRESSURE: 135 MMHG | DIASTOLIC BLOOD PRESSURE: 78 MMHG | HEART RATE: 96 BPM | OXYGEN SATURATION: 99 % | WEIGHT: 198.8 LBS | BODY MASS INDEX: 26.23 KG/M2 | RESPIRATION RATE: 12 BRPM

## 2024-04-22 DIAGNOSIS — E11.59 HYPERTENSION COMPLICATING DIABETES (HCC): Primary | ICD-10-CM

## 2024-04-22 DIAGNOSIS — I15.2 HYPERTENSION COMPLICATING DIABETES (HCC): Primary | ICD-10-CM

## 2024-04-22 DIAGNOSIS — E55.9 VITAMIN D DEFICIENCY: ICD-10-CM

## 2024-04-22 DIAGNOSIS — E11.69 HYPERLIPIDEMIA ASSOCIATED WITH TYPE 2 DIABETES MELLITUS (HCC): ICD-10-CM

## 2024-04-22 DIAGNOSIS — E78.5 HYPERLIPIDEMIA ASSOCIATED WITH TYPE 2 DIABETES MELLITUS (HCC): ICD-10-CM

## 2024-04-22 DIAGNOSIS — Z00.00 MEDICARE ANNUAL WELLNESS VISIT, SUBSEQUENT: Primary | ICD-10-CM

## 2024-04-22 PROCEDURE — 3078F DIAST BP <80 MM HG: CPT | Performed by: INTERNAL MEDICINE

## 2024-04-22 PROCEDURE — 99214 OFFICE O/P EST MOD 30 MIN: CPT | Performed by: INTERNAL MEDICINE

## 2024-04-22 PROCEDURE — G0439 PPPS, SUBSEQ VISIT: HCPCS | Performed by: INTERNAL MEDICINE

## 2024-04-22 PROCEDURE — 3075F SYST BP GE 130 - 139MM HG: CPT | Performed by: INTERNAL MEDICINE

## 2024-04-22 PROCEDURE — 1123F ACP DISCUSS/DSCN MKR DOCD: CPT | Performed by: INTERNAL MEDICINE

## 2024-04-22 SDOH — ECONOMIC STABILITY: FOOD INSECURITY: WITHIN THE PAST 12 MONTHS, YOU WORRIED THAT YOUR FOOD WOULD RUN OUT BEFORE YOU GOT MONEY TO BUY MORE.: NEVER TRUE

## 2024-04-22 SDOH — ECONOMIC STABILITY: FOOD INSECURITY: WITHIN THE PAST 12 MONTHS, THE FOOD YOU BOUGHT JUST DIDN'T LAST AND YOU DIDN'T HAVE MONEY TO GET MORE.: NEVER TRUE

## 2024-04-22 SDOH — ECONOMIC STABILITY: INCOME INSECURITY: HOW HARD IS IT FOR YOU TO PAY FOR THE VERY BASICS LIKE FOOD, HOUSING, MEDICAL CARE, AND HEATING?: NOT HARD AT ALL

## 2024-04-22 NOTE — PROGRESS NOTES
Medicare Annual Wellness Visit    Darin Naqvi is here for scheduled E and M visit but noted overdue for AWV so added on with LPN.    Assessment & Plan  AWV    Recommendations for Preventive Services Due: see orders and patient instructions/AVS.  Recommended screening schedule for the next 5-10 years is provided to the patient in written form: see Patient Instructions/AVS.     No follow-ups on file.     Subjective       Patient's complete Health Risk Assessment and screening values have been reviewed and are found in Flowsheets. The following problems were reviewed today and where indicated follow up appointments were made and/or referrals ordered.    Positive Risk Factor Screenings with Interventions:                Activity, Diet, and Weight:  On average, how many days per week do you engage in moderate to strenuous exercise (like a brisk walk)?: 0 days  On average, how many minutes do you engage in exercise at this level?: 0 min    Do you eat balanced/healthy meals regularly?: Yes    There is no height or weight on file to calculate BMI.        Inactivity Interventions:  See AVS for additional education material                           Objective   There were no vitals filed for this visit.   There is no height or weight on file to calculate BMI.             Allergies   Allergen Reactions    Sulfa Antibiotics      Prior to Visit Medications    Medication Sig Taking? Authorizing Provider   losartan (COZAAR) 25 MG tablet TAKE 1 TABLET BY MOUTH DAILY  Brenda Long MD   ammonium lactate (AMLACTIN) 12 % cream 1 Application  Severiano Gonsalves MD   simvastatin (ZOCOR) 40 MG tablet TAKE 1 TABLET BY MOUTH EVERY NIGHT  Brenda Long MD   metFORMIN (GLUCOPHAGE) 500 MG tablet Take 1 tablet by mouth daily  Brenda Long MD   Multiple Vitamins-Minerals (THERAPEUTIC MULTIVITAMIN-MINERALS) tablet Take 1 tablet by mouth daily  ProviderSeveriano MD   Cholecalciferol (VITAMIN D3) 1000 units CAPS Take 1,000
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
## 2024-04-22 NOTE — PATIENT INSTRUCTIONS
Manage other health problems such as diabetes, high blood pressure, and high cholesterol. If you think you may have a problem with alcohol or drug use, talk to your doctor.   Medicines    Take your medicines exactly as prescribed. Call your doctor if you think you are having a problem with your medicine.     If your doctor recommends aspirin, take the amount directed each day. Make sure you take aspirin and not another kind of pain reliever, such as acetaminophen (Tylenol).   When should you call for help?   Call 911 if you have symptoms of a heart attack. These may include:    Chest pain or pressure, or a strange feeling in the chest.     Sweating.     Shortness of breath.     Pain, pressure, or a strange feeling in the back, neck, jaw, or upper belly or in one or both shoulders or arms.     Lightheadedness or sudden weakness.     A fast or irregular heartbeat.   After you call 911, the  may tell you to chew 1 adult-strength or 2 to 4 low-dose aspirin. Wait for an ambulance. Do not try to drive yourself.  Watch closely for changes in your health, and be sure to contact your doctor if you have any problems.  Where can you learn more?  Go to https://www.[employer].net/patientEd and enter F075 to learn more about \"A Healthy Heart: Care Instructions.\"  Current as of: June 24, 2023               Content Version: 14.0  © 5406-1278 [employer].   Care instructions adapted under license by [email or web address]. If you have questions about a medical condition or this instruction, always ask your healthcare professional. [employer] disclaims any warranty or liability for your use of this information.      Personalized Preventive Plan for Darin Naqvi - 4/22/2024  Medicare offers a range of preventive health benefits. Some of the tests and screenings are paid in full while other may be subject to a deductible, co-insurance, and/or copay.    Some of these benefits include a comprehensive review of

## 2024-04-25 DIAGNOSIS — I15.2 HYPERTENSION COMPLICATING DIABETES (HCC): ICD-10-CM

## 2024-04-25 DIAGNOSIS — E78.5 HYPERLIPIDEMIA ASSOCIATED WITH TYPE 2 DIABETES MELLITUS (HCC): ICD-10-CM

## 2024-04-25 DIAGNOSIS — E55.9 VITAMIN D DEFICIENCY: ICD-10-CM

## 2024-04-25 DIAGNOSIS — E11.59 HYPERTENSION COMPLICATING DIABETES (HCC): ICD-10-CM

## 2024-04-25 DIAGNOSIS — E11.69 HYPERLIPIDEMIA ASSOCIATED WITH TYPE 2 DIABETES MELLITUS (HCC): ICD-10-CM

## 2024-04-25 LAB
25(OH)D3 SERPL-MCNC: 41 NG/ML
ALBUMIN SERPL-MCNC: 4.6 G/DL (ref 3.4–5)
ALBUMIN/GLOB SERPL: 2.1 {RATIO} (ref 1.1–2.2)
ALP SERPL-CCNC: 73 U/L (ref 40–129)
ALT SERPL-CCNC: 19 U/L (ref 10–40)
ANION GAP SERPL CALCULATED.3IONS-SCNC: 12 MMOL/L (ref 3–16)
AST SERPL-CCNC: 18 U/L (ref 15–37)
BILIRUB SERPL-MCNC: 0.3 MG/DL (ref 0–1)
BUN SERPL-MCNC: 13 MG/DL (ref 7–20)
CALCIUM SERPL-MCNC: 9.4 MG/DL (ref 8.3–10.6)
CHLORIDE SERPL-SCNC: 98 MMOL/L (ref 99–110)
CHOLEST SERPL-MCNC: 121 MG/DL (ref 0–199)
CO2 SERPL-SCNC: 28 MMOL/L (ref 21–32)
CREAT SERPL-MCNC: 0.9 MG/DL (ref 0.8–1.3)
GFR SERPLBLD CREATININE-BSD FMLA CKD-EPI: 89 ML/MIN/{1.73_M2}
GLUCOSE SERPL-MCNC: 137 MG/DL (ref 70–99)
HDLC SERPL-MCNC: 41 MG/DL (ref 40–60)
LDLC SERPL CALC-MCNC: 51 MG/DL
POTASSIUM SERPL-SCNC: 4.6 MMOL/L (ref 3.5–5.1)
PROT SERPL-MCNC: 6.8 G/DL (ref 6.4–8.2)
SODIUM SERPL-SCNC: 138 MMOL/L (ref 136–145)
TRIGL SERPL-MCNC: 147 MG/DL (ref 0–150)
VLDLC SERPL CALC-MCNC: 29 MG/DL

## 2024-04-26 LAB
EST. AVERAGE GLUCOSE BLD GHB EST-MCNC: 139.9 MG/DL
HBA1C MFR BLD: 6.5 %

## 2024-04-29 DIAGNOSIS — E11.59 HYPERTENSION COMPLICATING DIABETES (HCC): ICD-10-CM

## 2024-04-29 DIAGNOSIS — I15.2 HYPERTENSION COMPLICATING DIABETES (HCC): ICD-10-CM

## 2024-04-29 RX ORDER — LOSARTAN POTASSIUM 25 MG/1
25 TABLET ORAL DAILY
Qty: 90 TABLET | Refills: 1 | Status: SHIPPED | OUTPATIENT
Start: 2024-04-29

## 2024-06-17 DIAGNOSIS — Z79.4 TYPE 2 DIABETES MELLITUS WITHOUT COMPLICATION, WITH LONG-TERM CURRENT USE OF INSULIN (HCC): ICD-10-CM

## 2024-06-17 DIAGNOSIS — E11.9 TYPE 2 DIABETES MELLITUS WITHOUT COMPLICATION, WITH LONG-TERM CURRENT USE OF INSULIN (HCC): ICD-10-CM

## 2024-08-22 ENCOUNTER — OFFICE VISIT (OUTPATIENT)
Dept: INTERNAL MEDICINE CLINIC | Age: 74
End: 2024-08-22
Payer: MEDICARE

## 2024-08-22 DIAGNOSIS — F17.290 CIGAR SMOKER: ICD-10-CM

## 2024-08-22 DIAGNOSIS — E11.628 TYPE 2 DIABETES MELLITUS WITH PRESSURE CALLUS (HCC): ICD-10-CM

## 2024-08-22 DIAGNOSIS — E11.59 HYPERTENSION COMPLICATING DIABETES (HCC): Primary | ICD-10-CM

## 2024-08-22 DIAGNOSIS — E11.29 DIABETES MELLITUS WITH PROTEINURIA (HCC): ICD-10-CM

## 2024-08-22 DIAGNOSIS — Z87.891 PERSONAL HISTORY OF TOBACCO USE: ICD-10-CM

## 2024-08-22 DIAGNOSIS — L84 TYPE 2 DIABETES MELLITUS WITH PRESSURE CALLUS (HCC): ICD-10-CM

## 2024-08-22 DIAGNOSIS — E78.5 HYPERLIPIDEMIA ASSOCIATED WITH TYPE 2 DIABETES MELLITUS (HCC): ICD-10-CM

## 2024-08-22 DIAGNOSIS — I15.2 HYPERTENSION COMPLICATING DIABETES (HCC): Primary | ICD-10-CM

## 2024-08-22 DIAGNOSIS — E11.69 HYPERLIPIDEMIA ASSOCIATED WITH TYPE 2 DIABETES MELLITUS (HCC): ICD-10-CM

## 2024-08-22 DIAGNOSIS — R80.9 DIABETES MELLITUS WITH PROTEINURIA (HCC): ICD-10-CM

## 2024-08-22 PROCEDURE — 1123F ACP DISCUSS/DSCN MKR DOCD: CPT | Performed by: INTERNAL MEDICINE

## 2024-08-22 PROCEDURE — G0296 VISIT TO DETERM LDCT ELIG: HCPCS | Performed by: INTERNAL MEDICINE

## 2024-08-22 PROCEDURE — 3075F SYST BP GE 130 - 139MM HG: CPT | Performed by: INTERNAL MEDICINE

## 2024-08-22 PROCEDURE — 83036 HEMOGLOBIN GLYCOSYLATED A1C: CPT | Performed by: INTERNAL MEDICINE

## 2024-08-22 PROCEDURE — 3079F DIAST BP 80-89 MM HG: CPT | Performed by: INTERNAL MEDICINE

## 2024-08-22 PROCEDURE — 3044F HG A1C LEVEL LT 7.0%: CPT | Performed by: INTERNAL MEDICINE

## 2024-08-22 PROCEDURE — 99214 OFFICE O/P EST MOD 30 MIN: CPT | Performed by: INTERNAL MEDICINE

## 2024-08-22 NOTE — PROGRESS NOTES
\"INR\"     Lab Results   Component Value Date    PSA 0.41 01/10/2017    PSA 0.4 10/02/2015    PSA 0.37 07/02/2014        No results found for: \"LABURIC\"          /87   Pulse 100   Resp 12   Wt 89.5 kg (197 lb 6.4 oz)   SpO2 98%   BMI 26.04 kg/m²   Body mass index is 26.04 kg/m².  Physical Exam  Constitutional:       General: He is not in acute distress.     Appearance: He is not diaphoretic.   HENT:      Head: Normocephalic and atraumatic.      Nose: Nose normal.      Mouth/Throat:      Mouth: Mucous membranes are moist.   Eyes:      General: No scleral icterus.        Right eye: No discharge.         Left eye: No discharge.      Conjunctiva/sclera: Conjunctivae normal.      Pupils: Pupils are equal, round, and reactive to light.   Neck:      Thyroid: No thyromegaly.      Vascular: No JVD.      Trachea: No tracheal deviation.   Cardiovascular:      Rate and Rhythm: Normal rate and regular rhythm.      Heart sounds: Normal heart sounds. No murmur heard.     No friction rub. No gallop.   Pulmonary:      Effort: Pulmonary effort is normal. No respiratory distress.      Breath sounds: Normal breath sounds. No stridor. No wheezing.   Chest:      Chest wall: No tenderness.   Abdominal:      General: Bowel sounds are normal. There is no distension.      Palpations: Abdomen is soft. There is no mass.      Tenderness: There is no abdominal tenderness. There is no right CVA tenderness, left CVA tenderness, guarding or rebound.   Musculoskeletal:         General: No tenderness. Normal range of motion.      Cervical back: Normal range of motion and neck supple.   Lymphadenopathy:      Cervical: No cervical adenopathy.   Skin:     General: Skin is warm and dry.      Capillary Refill: Capillary refill takes less than 2 seconds.      Coloration: Skin is not pale.      Findings: No erythema or rash.   Neurological:      General: No focal deficit present.      Mental Status: He is alert and oriented to person, place, and

## 2024-08-23 VITALS
DIASTOLIC BLOOD PRESSURE: 87 MMHG | WEIGHT: 197.4 LBS | SYSTOLIC BLOOD PRESSURE: 138 MMHG | BODY MASS INDEX: 26.04 KG/M2 | HEART RATE: 100 BPM | OXYGEN SATURATION: 98 % | RESPIRATION RATE: 12 BRPM

## 2024-08-31 ENCOUNTER — TELEPHONE (OUTPATIENT)
Dept: CASE MANAGEMENT | Age: 74
End: 2024-08-31

## 2024-09-06 ENCOUNTER — HOSPITAL ENCOUNTER (OUTPATIENT)
Dept: CT IMAGING | Age: 74
Discharge: HOME | End: 2024-09-06
Payer: MEDICARE

## 2024-09-06 DIAGNOSIS — Z87.891 PERSONAL HISTORY OF TOBACCO USE: ICD-10-CM

## 2024-09-06 PROCEDURE — 71271 CT THORAX LUNG CANCER SCR C-: CPT

## 2024-10-28 DIAGNOSIS — E11.9 HYPERTENSION COMPLICATING DIABETES (HCC): ICD-10-CM

## 2024-10-28 DIAGNOSIS — I10 HYPERTENSION COMPLICATING DIABETES (HCC): ICD-10-CM

## 2024-10-28 RX ORDER — LOSARTAN POTASSIUM 25 MG/1
25 TABLET ORAL DAILY
Qty: 90 TABLET | Refills: 1 | OUTPATIENT
Start: 2024-10-28

## 2024-10-28 RX ORDER — LOSARTAN POTASSIUM 25 MG/1
25 TABLET ORAL DAILY
Qty: 90 TABLET | Refills: 1 | Status: SHIPPED | OUTPATIENT
Start: 2024-10-28

## 2024-11-22 ENCOUNTER — OFFICE VISIT (OUTPATIENT)
Dept: INTERNAL MEDICINE CLINIC | Age: 74
End: 2024-11-22

## 2024-11-22 VITALS
DIASTOLIC BLOOD PRESSURE: 83 MMHG | SYSTOLIC BLOOD PRESSURE: 138 MMHG | WEIGHT: 198.2 LBS | RESPIRATION RATE: 12 BRPM | OXYGEN SATURATION: 97 % | HEART RATE: 103 BPM | BODY MASS INDEX: 26.15 KG/M2

## 2024-11-22 DIAGNOSIS — E55.9 VITAMIN D DEFICIENCY: ICD-10-CM

## 2024-11-22 DIAGNOSIS — E11.29 DIABETES MELLITUS WITH PROTEINURIA (HCC): ICD-10-CM

## 2024-11-22 DIAGNOSIS — I10 HYPERTENSION COMPLICATING DIABETES (HCC): Primary | ICD-10-CM

## 2024-11-22 DIAGNOSIS — E11.9 HYPERTENSION COMPLICATING DIABETES (HCC): Primary | ICD-10-CM

## 2024-11-22 DIAGNOSIS — L84 TYPE 2 DIABETES MELLITUS WITH PRESSURE CALLUS (HCC): ICD-10-CM

## 2024-11-22 DIAGNOSIS — R80.9 DIABETES MELLITUS WITH PROTEINURIA (HCC): ICD-10-CM

## 2024-11-22 DIAGNOSIS — E78.5 HYPERLIPIDEMIA ASSOCIATED WITH TYPE 2 DIABETES MELLITUS (HCC): ICD-10-CM

## 2024-11-22 DIAGNOSIS — E11.69 HYPERLIPIDEMIA ASSOCIATED WITH TYPE 2 DIABETES MELLITUS (HCC): ICD-10-CM

## 2024-11-22 DIAGNOSIS — F17.290 CIGAR SMOKER: ICD-10-CM

## 2024-11-22 DIAGNOSIS — I87.2 VENOUS STASIS DERMATITIS OF BOTH LOWER EXTREMITIES: ICD-10-CM

## 2024-11-22 DIAGNOSIS — E11.628 TYPE 2 DIABETES MELLITUS WITH PRESSURE CALLUS (HCC): ICD-10-CM

## 2024-11-22 LAB — HBA1C MFR BLD: 6.3 %

## 2024-11-22 NOTE — PROGRESS NOTES
opportunity to have all questions answered.      CURRENT MEDS W/ ASSOC DIAG           Start Date End Date     Ascorbic Acid (VITAMIN C) 250 MG tablet  --  --     Associated Diagnoses:  --     aspirin 81 MG tablet  --  --     Associated Diagnoses:  --     Cholecalciferol (VITAMIN D3) 1000 units CAPS  01/22/18  --     Take 1,000 Units by mouth 2 times daily     Associated Diagnoses:  Vitamin D deficiency     losartan (COZAAR) 25 MG tablet  10/28/24  --     TAKE 1 TABLET BY MOUTH DAILY     Associated Diagnoses:  Hypertension complicating diabetes (HCC)     metFORMIN (GLUCOPHAGE) 500 MG tablet  06/18/24  --     TAKE 1 TABLET BY MOUTH DAILY     Associated Diagnoses:  Type 2 diabetes mellitus without complication, with long-term current use of insulin (HCC)     Multiple Vitamins-Minerals (THERAPEUTIC MULTIVITAMIN-MINERALS) tablet  --  --     Associated Diagnoses:  --     simvastatin (ZOCOR) 40 MG tablet  01/02/24  --     TAKE 1 TABLET BY MOUTH EVERY NIGHT     Associated Diagnoses:  Hyperlipidemia associated with type 2 diabetes mellitus (HCC)                 1. Hypertension complicating diabetes (HCC)--pt reports good bp control though elevated here; glycemic control is good  - POCT glycosylated hemoglobin (Hb A1C)  - POCT microalbumin    2. Cigar smoker--Patient was counseled on tobacco cessation.  Based upon patient's motivation to change his behavior, the following plan was agreed upon: patient is not ready to work toward tobacco cessation at this time.  He was provided with a list of local tobacco cessation resources. Provider spent 5 minutes counseling patient.      3. Diabetes mellitus with proteinuria (HCC)--pt refused microalbumin test today but has known proteinuria    4. Hyperlipidemia associated with type 2 diabetes mellitus (HCC)    5. Diabetes with pressure callous--continues to follow in podiatry for trimming        Problem List             Diagnosed    Hypertension complicating diabetes (HCC) - Primary

## 2024-12-23 DIAGNOSIS — E78.5 HYPERLIPIDEMIA ASSOCIATED WITH TYPE 2 DIABETES MELLITUS (HCC): ICD-10-CM

## 2024-12-23 DIAGNOSIS — E11.69 HYPERLIPIDEMIA ASSOCIATED WITH TYPE 2 DIABETES MELLITUS (HCC): ICD-10-CM

## 2024-12-24 RX ORDER — SIMVASTATIN 40 MG
40 TABLET ORAL NIGHTLY
Qty: 90 TABLET | Refills: 3 | Status: SHIPPED | OUTPATIENT
Start: 2024-12-24

## 2025-03-22 ASSESSMENT — PATIENT HEALTH QUESTIONNAIRE - PHQ9
1. LITTLE INTEREST OR PLEASURE IN DOING THINGS: NOT AT ALL
2. FEELING DOWN, DEPRESSED OR HOPELESS: NOT AT ALL
SUM OF ALL RESPONSES TO PHQ QUESTIONS 1-9: 0
2. FEELING DOWN, DEPRESSED OR HOPELESS: NOT AT ALL
1. LITTLE INTEREST OR PLEASURE IN DOING THINGS: NOT AT ALL
SUM OF ALL RESPONSES TO PHQ QUESTIONS 1-9: 0
SUM OF ALL RESPONSES TO PHQ9 QUESTIONS 1 & 2: 0

## 2025-03-24 ENCOUNTER — OFFICE VISIT (OUTPATIENT)
Dept: INTERNAL MEDICINE CLINIC | Age: 75
End: 2025-03-24
Payer: MEDICARE

## 2025-03-24 VITALS
HEART RATE: 99 BPM | BODY MASS INDEX: 27.94 KG/M2 | RESPIRATION RATE: 12 BRPM | WEIGHT: 199.6 LBS | HEIGHT: 71 IN | OXYGEN SATURATION: 97 % | DIASTOLIC BLOOD PRESSURE: 80 MMHG | SYSTOLIC BLOOD PRESSURE: 129 MMHG

## 2025-03-24 DIAGNOSIS — I87.2 VENOUS STASIS DERMATITIS OF BOTH LOWER EXTREMITIES: ICD-10-CM

## 2025-03-24 DIAGNOSIS — E11.69 HYPERLIPIDEMIA ASSOCIATED WITH TYPE 2 DIABETES MELLITUS: ICD-10-CM

## 2025-03-24 DIAGNOSIS — L84 TYPE 2 DIABETES MELLITUS WITH PRESSURE CALLUS (HCC): ICD-10-CM

## 2025-03-24 DIAGNOSIS — E78.5 HYPERLIPIDEMIA ASSOCIATED WITH TYPE 2 DIABETES MELLITUS: ICD-10-CM

## 2025-03-24 DIAGNOSIS — Z00.00 MEDICARE ANNUAL WELLNESS VISIT, SUBSEQUENT: Primary | ICD-10-CM

## 2025-03-24 DIAGNOSIS — R80.9 DIABETES MELLITUS WITH PROTEINURIA (HCC): ICD-10-CM

## 2025-03-24 DIAGNOSIS — E11.628 TYPE 2 DIABETES MELLITUS WITH PRESSURE CALLUS (HCC): ICD-10-CM

## 2025-03-24 DIAGNOSIS — E55.9 VITAMIN D DEFICIENCY: ICD-10-CM

## 2025-03-24 DIAGNOSIS — E11.9 HYPERTENSION COMPLICATING DIABETES (HCC): Primary | ICD-10-CM

## 2025-03-24 DIAGNOSIS — I10 HYPERTENSION COMPLICATING DIABETES (HCC): Primary | ICD-10-CM

## 2025-03-24 DIAGNOSIS — E11.29 DIABETES MELLITUS WITH PROTEINURIA (HCC): ICD-10-CM

## 2025-03-24 PROCEDURE — G0439 PPPS, SUBSEQ VISIT: HCPCS | Performed by: INTERNAL MEDICINE

## 2025-03-24 PROCEDURE — 3079F DIAST BP 80-89 MM HG: CPT | Performed by: INTERNAL MEDICINE

## 2025-03-24 PROCEDURE — 1123F ACP DISCUSS/DSCN MKR DOCD: CPT | Performed by: INTERNAL MEDICINE

## 2025-03-24 PROCEDURE — 1159F MED LIST DOCD IN RCRD: CPT | Performed by: INTERNAL MEDICINE

## 2025-03-24 PROCEDURE — G2211 COMPLEX E/M VISIT ADD ON: HCPCS | Performed by: INTERNAL MEDICINE

## 2025-03-24 PROCEDURE — 3074F SYST BP LT 130 MM HG: CPT | Performed by: INTERNAL MEDICINE

## 2025-03-24 PROCEDURE — 99214 OFFICE O/P EST MOD 30 MIN: CPT | Performed by: INTERNAL MEDICINE

## 2025-03-24 RX ORDER — LOSARTAN POTASSIUM 25 MG/1
25 TABLET ORAL DAILY
Qty: 90 TABLET | Refills: 1 | Status: SHIPPED | OUTPATIENT
Start: 2025-03-24

## 2025-03-24 SDOH — ECONOMIC STABILITY: FOOD INSECURITY: WITHIN THE PAST 12 MONTHS, YOU WORRIED THAT YOUR FOOD WOULD RUN OUT BEFORE YOU GOT MONEY TO BUY MORE.: NEVER TRUE

## 2025-03-24 SDOH — ECONOMIC STABILITY: FOOD INSECURITY: WITHIN THE PAST 12 MONTHS, THE FOOD YOU BOUGHT JUST DIDN'T LAST AND YOU DIDN'T HAVE MONEY TO GET MORE.: NEVER TRUE

## 2025-03-24 ASSESSMENT — LIFESTYLE VARIABLES
HOW OFTEN DO YOU HAVE A DRINK CONTAINING ALCOHOL: NEVER
HOW MANY STANDARD DRINKS CONTAINING ALCOHOL DO YOU HAVE ON A TYPICAL DAY: PATIENT DOES NOT DRINK

## 2025-03-24 NOTE — PATIENT INSTRUCTIONS
Learning About Being Active as an Older Adult  Why is being active important as you get older?     Being active is one of the best things you can do for your health. And it's never too late to start. Being active--or getting active, if you aren't already--has definite benefits. It can:  Give you more energy,  Keep your mind sharp.  Improve balance to reduce your risk of falls.  Help you manage chronic illness with fewer medicines.  No matter how old you are, how fit you are, or what health problems you have, there is a form of activity that will work for you. And the more physical activity you can do, the better your overall health will be.  What kinds of activity can help you stay healthy?  Being more active will make your daily activities easier. Physical activity includes planned exercise and things you do in daily life. There are four types of activity:  Aerobic.  Doing aerobic activity makes your heart and lungs strong.  Includes walking, dancing, and gardening.  Aim for at least 2½ hours spread throughout the week.  It improves your energy and can help you sleep better.  Muscle-strengthening.  This type of activity can help maintain muscle and strengthen bones.  Includes climbing stairs, using resistance bands, and lifting or carrying heavy loads.  Aim for at least twice a week.  It can help protect the knees and other joints.  Stretching.  Stretching gives you better range of motion in joints and muscles.  Includes upper arm stretches, calf stretches, and gentle yoga.  Aim for at least twice a week, preferably after your muscles are warmed up from other activities.  It can help you function better in daily life.  Balancing.  This helps you stay coordinated and have good posture.  Includes heel-to-toe walking, daniel chi, and certain types of yoga.  Aim for at least 3 days a week.  It can reduce your risk of falling.  Even if you have a hard time meeting the recommendations, it's better to be more active

## 2025-03-24 NOTE — PROGRESS NOTES
Medicare Annual Wellness Visit    Darin Naqvi is here for scheduled E and M Visit but noted overdue for AWV so added on with LPN.    Assessment & Plan  AWV       No follow-ups on file.     Subjective       Patient's complete Health Risk Assessment and screening values have been reviewed and are found in Flowsheets. The following problems were reviewed today and where indicated follow up appointments were made and/or referrals ordered.    Positive Risk Factor Screenings with Interventions:              Inactivity:  On average, how many days per week do you engage in moderate to strenuous exercise (like a brisk walk)?: 0 days (!) Abnormal  On average, how many minutes do you engage in exercise at this level?: 0 min    Interventions:  See AVS for additional education material                         Objective   There were no vitals filed for this visit.   There is no height or weight on file to calculate BMI.                    Allergies   Allergen Reactions    Sulfa Antibiotics      Prior to Visit Medications    Medication Sig Taking? Authorizing Provider   losartan (COZAAR) 25 MG tablet Take 1 tablet by mouth daily  Brenda Long MD   simvastatin (ZOCOR) 40 MG tablet Take 1 tablet by mouth nightly  Brenda Long MD   metFORMIN (GLUCOPHAGE) 500 MG tablet TAKE 1 TABLET BY MOUTH DAILY  Brenda Long MD   Multiple Vitamins-Minerals (THERAPEUTIC MULTIVITAMIN-MINERALS) tablet Take 1 tablet by mouth daily  Severiano Gonsalves MD   Cholecalciferol (VITAMIN D3) 1000 units CAPS Take 1,000 Units by mouth 2 times daily  Brenda Long MD   aspirin 81 MG tablet Take 1 tablet by mouth daily  Severiano Gonsalves MD   Ascorbic Acid (VITAMIN C) 250 MG tablet Take 2 tablets by mouth daily  ProviderSeveriano MD       CareTeam (Including outside providers/suppliers regularly involved in providing care):   Patient Care Team:  Brenda Long MD as PCP - General (Internal Medicine)  Brenda Long MD as

## 2025-03-25 DIAGNOSIS — E11.29 DIABETES MELLITUS WITH PROTEINURIA (HCC): ICD-10-CM

## 2025-03-25 DIAGNOSIS — E11.9 HYPERTENSION COMPLICATING DIABETES (HCC): ICD-10-CM

## 2025-03-25 DIAGNOSIS — E55.9 VITAMIN D DEFICIENCY: ICD-10-CM

## 2025-03-25 DIAGNOSIS — R80.9 DIABETES MELLITUS WITH PROTEINURIA (HCC): ICD-10-CM

## 2025-03-25 DIAGNOSIS — E78.5 HYPERLIPIDEMIA ASSOCIATED WITH TYPE 2 DIABETES MELLITUS: ICD-10-CM

## 2025-03-25 DIAGNOSIS — I10 HYPERTENSION COMPLICATING DIABETES (HCC): ICD-10-CM

## 2025-03-25 DIAGNOSIS — L84 TYPE 2 DIABETES MELLITUS WITH PRESSURE CALLUS (HCC): ICD-10-CM

## 2025-03-25 DIAGNOSIS — E11.628 TYPE 2 DIABETES MELLITUS WITH PRESSURE CALLUS (HCC): ICD-10-CM

## 2025-03-25 DIAGNOSIS — E11.69 HYPERLIPIDEMIA ASSOCIATED WITH TYPE 2 DIABETES MELLITUS: ICD-10-CM

## 2025-03-25 LAB
25(OH)D3 SERPL-MCNC: 62.8 NG/ML
ALBUMIN SERPL-MCNC: 4.8 G/DL (ref 3.4–5)
ALBUMIN/GLOB SERPL: 1.8 {RATIO} (ref 1.1–2.2)
ALP SERPL-CCNC: 79 U/L (ref 40–129)
ALT SERPL-CCNC: 28 U/L (ref 10–40)
ANION GAP SERPL CALCULATED.3IONS-SCNC: 13 MMOL/L (ref 3–16)
AST SERPL-CCNC: 23 U/L (ref 15–37)
BILIRUB SERPL-MCNC: 0.4 MG/DL (ref 0–1)
BUN SERPL-MCNC: 12 MG/DL (ref 7–20)
CALCIUM SERPL-MCNC: 10 MG/DL (ref 8.3–10.6)
CHLORIDE SERPL-SCNC: 99 MMOL/L (ref 99–110)
CHOLEST SERPL-MCNC: 138 MG/DL (ref 0–199)
CO2 SERPL-SCNC: 25 MMOL/L (ref 21–32)
CREAT SERPL-MCNC: 1 MG/DL (ref 0.8–1.3)
CREAT UR-MCNC: 51.3 MG/DL (ref 39–259)
EST. AVERAGE GLUCOSE BLD GHB EST-MCNC: 139.9 MG/DL
GFR SERPLBLD CREATININE-BSD FMLA CKD-EPI: 78 ML/MIN/{1.73_M2}
GLUCOSE SERPL-MCNC: 131 MG/DL (ref 70–99)
HBA1C MFR BLD: 6.5 %
HDLC SERPL-MCNC: 44 MG/DL (ref 40–60)
LDLC SERPL CALC-MCNC: 67 MG/DL
MICROALBUMIN UR DL<=1MG/L-MCNC: <1.2 MG/DL
MICROALBUMIN/CREAT UR: NORMAL MG/G (ref 0–30)
POTASSIUM SERPL-SCNC: 4.6 MMOL/L (ref 3.5–5.1)
PROT SERPL-MCNC: 7.4 G/DL (ref 6.4–8.2)
SODIUM SERPL-SCNC: 137 MMOL/L (ref 136–145)
TRIGL SERPL-MCNC: 137 MG/DL (ref 0–150)
VLDLC SERPL CALC-MCNC: 27 MG/DL

## 2025-04-01 ENCOUNTER — RESULTS FOLLOW-UP (OUTPATIENT)
Dept: INTERNAL MEDICINE CLINIC | Age: 75
End: 2025-04-01

## 2025-06-14 DIAGNOSIS — E11.9 TYPE 2 DIABETES MELLITUS WITHOUT COMPLICATION, WITH LONG-TERM CURRENT USE OF INSULIN (HCC): ICD-10-CM

## 2025-06-14 DIAGNOSIS — Z79.4 TYPE 2 DIABETES MELLITUS WITHOUT COMPLICATION, WITH LONG-TERM CURRENT USE OF INSULIN (HCC): ICD-10-CM

## 2025-06-24 ENCOUNTER — OFFICE VISIT (OUTPATIENT)
Dept: INTERNAL MEDICINE CLINIC | Age: 75
End: 2025-06-24
Payer: MEDICARE

## 2025-06-24 VITALS
HEART RATE: 98 BPM | OXYGEN SATURATION: 96 % | WEIGHT: 195.38 LBS | HEIGHT: 71 IN | BODY MASS INDEX: 27.35 KG/M2 | SYSTOLIC BLOOD PRESSURE: 133 MMHG | DIASTOLIC BLOOD PRESSURE: 77 MMHG

## 2025-06-24 DIAGNOSIS — E78.5 HYPERLIPIDEMIA ASSOCIATED WITH TYPE 2 DIABETES MELLITUS (HCC): ICD-10-CM

## 2025-06-24 DIAGNOSIS — L84 TYPE 2 DIABETES MELLITUS WITH PRESSURE CALLUS (HCC): ICD-10-CM

## 2025-06-24 DIAGNOSIS — E11.628 TYPE 2 DIABETES MELLITUS WITH PRESSURE CALLUS (HCC): ICD-10-CM

## 2025-06-24 DIAGNOSIS — R80.9 DIABETES MELLITUS WITH PROTEINURIA (HCC): ICD-10-CM

## 2025-06-24 DIAGNOSIS — I10 HYPERTENSION COMPLICATING DIABETES (HCC): Primary | ICD-10-CM

## 2025-06-24 DIAGNOSIS — I87.2 VENOUS STASIS DERMATITIS OF BOTH LOWER EXTREMITIES: ICD-10-CM

## 2025-06-24 DIAGNOSIS — E11.9 HYPERTENSION COMPLICATING DIABETES (HCC): Primary | ICD-10-CM

## 2025-06-24 DIAGNOSIS — E11.69 HYPERLIPIDEMIA ASSOCIATED WITH TYPE 2 DIABETES MELLITUS (HCC): ICD-10-CM

## 2025-06-24 DIAGNOSIS — E11.29 DIABETES MELLITUS WITH PROTEINURIA (HCC): ICD-10-CM

## 2025-06-24 LAB — HBA1C MFR BLD: 6.7 %

## 2025-06-24 PROCEDURE — 83036 HEMOGLOBIN GLYCOSYLATED A1C: CPT | Performed by: INTERNAL MEDICINE

## 2025-06-24 PROCEDURE — 99214 OFFICE O/P EST MOD 30 MIN: CPT | Performed by: INTERNAL MEDICINE

## 2025-06-24 PROCEDURE — 1123F ACP DISCUSS/DSCN MKR DOCD: CPT | Performed by: INTERNAL MEDICINE

## 2025-06-24 PROCEDURE — 3078F DIAST BP <80 MM HG: CPT | Performed by: INTERNAL MEDICINE

## 2025-06-24 PROCEDURE — 3044F HG A1C LEVEL LT 7.0%: CPT | Performed by: INTERNAL MEDICINE

## 2025-06-24 PROCEDURE — 1160F RVW MEDS BY RX/DR IN RCRD: CPT | Performed by: INTERNAL MEDICINE

## 2025-06-24 PROCEDURE — 3075F SYST BP GE 130 - 139MM HG: CPT | Performed by: INTERNAL MEDICINE

## 2025-06-24 PROCEDURE — 1159F MED LIST DOCD IN RCRD: CPT | Performed by: INTERNAL MEDICINE

## 2025-06-24 RX ORDER — LOSARTAN POTASSIUM 25 MG/1
25 TABLET ORAL DAILY
Qty: 90 TABLET | Refills: 1 | Status: SHIPPED | OUTPATIENT
Start: 2025-06-24

## 2025-06-24 ASSESSMENT — PATIENT HEALTH QUESTIONNAIRE - PHQ9
SUM OF ALL RESPONSES TO PHQ QUESTIONS 1-9: 0
1. LITTLE INTEREST OR PLEASURE IN DOING THINGS: NOT AT ALL
SUM OF ALL RESPONSES TO PHQ QUESTIONS 1-9: 0
SUM OF ALL RESPONSES TO PHQ QUESTIONS 1-9: 0
2. FEELING DOWN, DEPRESSED OR HOPELESS: NOT AT ALL
SUM OF ALL RESPONSES TO PHQ QUESTIONS 1-9: 0

## 2025-06-24 NOTE — PROGRESS NOTES
Chief Complaint   Patient presents with    3 Month Follow-Up       HPI: Here for diabetes followup and management of multiple chronic conditions as per the active problems list on chart, which I reviewed and updated with the patient today. States doing well with no new concerns except if noted below.    States home glucoses are good, usually 110s. No new concerns.    I have reviewed the chart notes available from myself and other providers. I have reviewed and addressed all active problems and created or updated the problems list in detail, as needed.    No problem-specific Assessment & Plan notes found for this encounter.      I have extensively reviewed and reconciled the medication list, discontinued medications not taking or no longer appropriate, and updated the active meds list        Lab Results   Component Value Date    LABA1C 6.5 03/25/2025    LABA1C 6.3 11/22/2024    LABA1C 6.5 04/25/2024       Lab Results   Component Value Date     03/25/2025     04/25/2024     (L) 02/21/2023    K 4.6 03/25/2025    K 4.6 04/25/2024    K 4.4 02/21/2023    CL 99 03/25/2025    CL 98 (L) 04/25/2024    CL 98 (L) 02/21/2023    CO2 25 03/25/2025    CO2 28 04/25/2024    CO2 23 02/21/2023    BUN 12 03/25/2025    BUN 13 04/25/2024    BUN 11 02/21/2023    CREATININE 1.0 03/25/2025    CREATININE 0.9 04/25/2024    CREATININE 0.9 02/21/2023    GLUCOSE 131 (H) 03/25/2025    GLUCOSE 137 (H) 04/25/2024    GLUCOSE 105 (H) 02/21/2023    CALCIUM 10.0 03/25/2025    CALCIUM 9.4 04/25/2024    CALCIUM 9.7 02/21/2023       Lab Results   Component Value Date    CHOL 138 03/25/2025    CHOL 121 04/25/2024    CHOL 119 02/21/2023    TRIG 137 03/25/2025    TRIG 147 04/25/2024    TRIG 136 02/21/2023    HDL 44 03/25/2025    HDL 41 04/25/2024    HDL 41 02/21/2023       Lab Results   Component Value Date    ALT 28 03/25/2025    ALT 19 04/25/2024    ALT 21 02/21/2023    AST 23 03/25/2025    AST 18 04/25/2024    AST 19 02/21/2023

## 2025-07-21 SDOH — HEALTH STABILITY: PHYSICAL HEALTH: ON AVERAGE, HOW MANY DAYS PER WEEK DO YOU ENGAGE IN MODERATE TO STRENUOUS EXERCISE (LIKE A BRISK WALK)?: 1 DAY

## 2025-07-21 SDOH — HEALTH STABILITY: PHYSICAL HEALTH: ON AVERAGE, HOW MANY MINUTES DO YOU ENGAGE IN EXERCISE AT THIS LEVEL?: 20 MIN

## 2025-07-22 ENCOUNTER — OFFICE VISIT (OUTPATIENT)
Dept: FAMILY MEDICINE CLINIC | Age: 75
End: 2025-07-22
Payer: MEDICARE

## 2025-07-22 VITALS
WEIGHT: 198 LBS | SYSTOLIC BLOOD PRESSURE: 130 MMHG | BODY MASS INDEX: 27.72 KG/M2 | HEIGHT: 71 IN | DIASTOLIC BLOOD PRESSURE: 82 MMHG

## 2025-07-22 DIAGNOSIS — Z87.891 PERSONAL HISTORY OF TOBACCO USE: ICD-10-CM

## 2025-07-22 DIAGNOSIS — E78.5 HYPERLIPIDEMIA ASSOCIATED WITH TYPE 2 DIABETES MELLITUS (HCC): ICD-10-CM

## 2025-07-22 DIAGNOSIS — Z23 IMMUNIZATION DUE: ICD-10-CM

## 2025-07-22 DIAGNOSIS — Z28.21 HERPES ZOSTER VACCINATION DECLINED: ICD-10-CM

## 2025-07-22 DIAGNOSIS — D69.2 SENILE PURPURA: ICD-10-CM

## 2025-07-22 DIAGNOSIS — R80.9 DIABETES MELLITUS WITH PROTEINURIA (HCC): ICD-10-CM

## 2025-07-22 DIAGNOSIS — E11.9 HYPERTENSION COMPLICATING DIABETES (HCC): Primary | ICD-10-CM

## 2025-07-22 DIAGNOSIS — E55.9 VITAMIN D DEFICIENCY: ICD-10-CM

## 2025-07-22 DIAGNOSIS — E11.29 DIABETES MELLITUS WITH PROTEINURIA (HCC): ICD-10-CM

## 2025-07-22 DIAGNOSIS — I10 HYPERTENSION COMPLICATING DIABETES (HCC): Primary | ICD-10-CM

## 2025-07-22 DIAGNOSIS — E11.69 HYPERLIPIDEMIA ASSOCIATED WITH TYPE 2 DIABETES MELLITUS (HCC): ICD-10-CM

## 2025-07-22 PROCEDURE — G0009 ADMIN PNEUMOCOCCAL VACCINE: HCPCS | Performed by: FAMILY MEDICINE

## 2025-07-22 PROCEDURE — 1123F ACP DISCUSS/DSCN MKR DOCD: CPT | Performed by: FAMILY MEDICINE

## 2025-07-22 PROCEDURE — 1159F MED LIST DOCD IN RCRD: CPT | Performed by: FAMILY MEDICINE

## 2025-07-22 PROCEDURE — 3075F SYST BP GE 130 - 139MM HG: CPT | Performed by: FAMILY MEDICINE

## 2025-07-22 PROCEDURE — 90677 PCV20 VACCINE IM: CPT | Performed by: FAMILY MEDICINE

## 2025-07-22 PROCEDURE — 3079F DIAST BP 80-89 MM HG: CPT | Performed by: FAMILY MEDICINE

## 2025-07-22 PROCEDURE — 99214 OFFICE O/P EST MOD 30 MIN: CPT | Performed by: FAMILY MEDICINE

## 2025-07-22 PROCEDURE — 3044F HG A1C LEVEL LT 7.0%: CPT | Performed by: FAMILY MEDICINE

## 2025-07-22 NOTE — PROGRESS NOTES
Today's Date: 2025     Darin Naqvi (:  1950) is a 75 y.o. male here for evaluation of the following chief complaint(s):  Established New Doctor, Hypertension, and Knee Pain (\"Not pain, just there\" both knees)      HPI  Name: Darin  :   Home: Self  Employed: Retired sales  Covid: Refused  Tdap: Up-to-date due in 2027  Pneum: Prevnar 20 today  Shingle: Refused  Colon: Refused  ETOH: None  Tob: 1 and half packs per day x 25 years, currently smoking cigars  Other: None  Subspecialists: Dr. HENRY, podiatry.  CEI, ophthalmology.    Med DX: DM, HTN, lipid, vitamin D, tobacco      Review of Systems:  Constitutional:  No fever or chills, decreased activity, fatigue, insomnia, irritability, night sweats, or weight change.  HEENT:  No headaches, photophobia, pain, visual loss (+glasses), vertigo, tinnitus, otalgia, hearing loss, olfactory disturbance, change in taste, dysphagia, mouth sores, epistaxis, nasal congestion, rhinorrhea, no teeth pain though poor dentition.  Respiratory:  No cough, dyspnea, pleuritic pain, or wheeze.  Cardio:  No chest pain, + bilateral pedal edema, no orthopnea, irregular heartbeat/palpitations.  Abdomen: No abdominal pain, bloating, change in bowel movements, heartburn, hemorrhoids, melena, nausea, or vomiting.  :  No flank pain, dysuria, hesitancy, decreased output, nocturia, or urinary incontinence.  Reproductive:  No scrotal pain or nodules, no erectile dysfunction, +decreased libido.  Rectal:  No rectal pain, blood in stool, hemorrhoids, no colonoscopy.  Endo:  No cold or heat intolerance, excessive diaphoresis, or generalized weakness.  MS:  No muscle weakness, myalgia, joint pain, or back pain. +OA.  Neuro:  + Ortho static dizziness due to medication, no weakness, gait disturbance, loss of consciousness, memory impairment, paresthesia, seizures, speech changes, or tremors.  Derm:  No rash, change in moles, skin lesion, nail changes, or pruritus. + Senile